# Patient Record
Sex: FEMALE | Race: WHITE | NOT HISPANIC OR LATINO | URBAN - METROPOLITAN AREA
[De-identification: names, ages, dates, MRNs, and addresses within clinical notes are randomized per-mention and may not be internally consistent; named-entity substitution may affect disease eponyms.]

---

## 2022-02-10 ENCOUNTER — OUTPATIENT (OUTPATIENT)
Dept: OUTPATIENT SERVICES | Facility: HOSPITAL | Age: 32
LOS: 1 days | Discharge: HOME | End: 2022-02-10

## 2022-02-10 DIAGNOSIS — N97.9 FEMALE INFERTILITY, UNSPECIFIED: ICD-10-CM

## 2022-06-14 ENCOUNTER — OUTPATIENT (OUTPATIENT)
Dept: OUTPATIENT SERVICES | Facility: HOSPITAL | Age: 32
LOS: 1 days | Discharge: HOME | End: 2022-06-14
Payer: COMMERCIAL

## 2022-06-14 VITALS
WEIGHT: 167.99 LBS | HEIGHT: 62 IN | HEART RATE: 60 BPM | SYSTOLIC BLOOD PRESSURE: 106 MMHG | TEMPERATURE: 98 F | RESPIRATION RATE: 16 BRPM | DIASTOLIC BLOOD PRESSURE: 64 MMHG | OXYGEN SATURATION: 98 %

## 2022-06-14 DIAGNOSIS — N84.0 POLYP OF CORPUS UTERI: ICD-10-CM

## 2022-06-14 DIAGNOSIS — Z01.818 ENCOUNTER FOR OTHER PREPROCEDURAL EXAMINATION: ICD-10-CM

## 2022-06-14 LAB
ALBUMIN SERPL ELPH-MCNC: 4.7 G/DL — SIGNIFICANT CHANGE UP (ref 3.5–5.2)
ALP SERPL-CCNC: 65 U/L — SIGNIFICANT CHANGE UP (ref 30–115)
ALT FLD-CCNC: 9 U/L — SIGNIFICANT CHANGE UP (ref 0–41)
ANION GAP SERPL CALC-SCNC: 12 MMOL/L — SIGNIFICANT CHANGE UP (ref 7–14)
APTT BLD: 30.4 SEC — SIGNIFICANT CHANGE UP (ref 27–39.2)
AST SERPL-CCNC: 16 U/L — SIGNIFICANT CHANGE UP (ref 0–41)
BASOPHILS # BLD AUTO: 0.07 K/UL — SIGNIFICANT CHANGE UP (ref 0–0.2)
BASOPHILS NFR BLD AUTO: 0.5 % — SIGNIFICANT CHANGE UP (ref 0–1)
BILIRUB SERPL-MCNC: 0.4 MG/DL — SIGNIFICANT CHANGE UP (ref 0.2–1.2)
BUN SERPL-MCNC: 13 MG/DL — SIGNIFICANT CHANGE UP (ref 10–20)
CALCIUM SERPL-MCNC: 9.5 MG/DL — SIGNIFICANT CHANGE UP (ref 8.5–10.1)
CHLORIDE SERPL-SCNC: 102 MMOL/L — SIGNIFICANT CHANGE UP (ref 98–110)
CO2 SERPL-SCNC: 23 MMOL/L — SIGNIFICANT CHANGE UP (ref 17–32)
CREAT SERPL-MCNC: 0.8 MG/DL — SIGNIFICANT CHANGE UP (ref 0.7–1.5)
EGFR: 101 ML/MIN/1.73M2 — SIGNIFICANT CHANGE UP
EOSINOPHIL # BLD AUTO: 0.07 K/UL — SIGNIFICANT CHANGE UP (ref 0–0.7)
EOSINOPHIL NFR BLD AUTO: 0.5 % — SIGNIFICANT CHANGE UP (ref 0–8)
GLUCOSE SERPL-MCNC: 66 MG/DL — LOW (ref 70–99)
HCT VFR BLD CALC: 40.1 % — SIGNIFICANT CHANGE UP (ref 37–47)
HGB BLD-MCNC: 13.2 G/DL — SIGNIFICANT CHANGE UP (ref 12–16)
IMM GRANULOCYTES NFR BLD AUTO: 0.6 % — HIGH (ref 0.1–0.3)
INR BLD: 1.03 RATIO — SIGNIFICANT CHANGE UP (ref 0.65–1.3)
LYMPHOCYTES # BLD AUTO: 2.8 K/UL — SIGNIFICANT CHANGE UP (ref 1.2–3.4)
LYMPHOCYTES # BLD AUTO: 20.1 % — LOW (ref 20.5–51.1)
MCHC RBC-ENTMCNC: 30.7 PG — SIGNIFICANT CHANGE UP (ref 27–31)
MCHC RBC-ENTMCNC: 32.9 G/DL — SIGNIFICANT CHANGE UP (ref 32–37)
MCV RBC AUTO: 93.3 FL — SIGNIFICANT CHANGE UP (ref 81–99)
MONOCYTES # BLD AUTO: 0.64 K/UL — HIGH (ref 0.1–0.6)
MONOCYTES NFR BLD AUTO: 4.6 % — SIGNIFICANT CHANGE UP (ref 1.7–9.3)
NEUTROPHILS # BLD AUTO: 10.24 K/UL — HIGH (ref 1.4–6.5)
NEUTROPHILS NFR BLD AUTO: 73.7 % — SIGNIFICANT CHANGE UP (ref 42.2–75.2)
NRBC # BLD: 0 /100 WBCS — SIGNIFICANT CHANGE UP (ref 0–0)
PLATELET # BLD AUTO: 309 K/UL — SIGNIFICANT CHANGE UP (ref 130–400)
POTASSIUM SERPL-MCNC: 4.1 MMOL/L — SIGNIFICANT CHANGE UP (ref 3.5–5)
POTASSIUM SERPL-SCNC: 4.1 MMOL/L — SIGNIFICANT CHANGE UP (ref 3.5–5)
PROT SERPL-MCNC: 7 G/DL — SIGNIFICANT CHANGE UP (ref 6–8)
PROTHROM AB SERPL-ACNC: 11.8 SEC — SIGNIFICANT CHANGE UP (ref 9.95–12.87)
RBC # BLD: 4.3 M/UL — SIGNIFICANT CHANGE UP (ref 4.2–5.4)
RBC # FLD: 13.9 % — SIGNIFICANT CHANGE UP (ref 11.5–14.5)
SODIUM SERPL-SCNC: 137 MMOL/L — SIGNIFICANT CHANGE UP (ref 135–146)
T3 SERPL-MCNC: 108 NG/DL — SIGNIFICANT CHANGE UP (ref 80–200)
T4 AB SER-ACNC: 12.2 UG/DL — HIGH (ref 4.6–12)
TSH SERPL-MCNC: 4.32 UIU/ML — HIGH (ref 0.27–4.2)
WBC # BLD: 13.91 K/UL — HIGH (ref 4.8–10.8)
WBC # FLD AUTO: 13.91 K/UL — HIGH (ref 4.8–10.8)

## 2022-06-14 PROCEDURE — 93010 ELECTROCARDIOGRAM REPORT: CPT

## 2022-06-14 NOTE — H&P PST ADULT - NSICDXPASTMEDICALHX_GEN_ALL_CORE_FT
PAST MEDICAL HISTORY:  Pneumonia 2016- hospitialized     PAST MEDICAL HISTORY:  Hypothyroid WAS HYPERTHYROID D/T NODULES/ [facility] RADIOACTIVE IODINE 2014    Pneumonia 2016- hospitialized

## 2022-06-14 NOTE — H&P PST ADULT - HISTORY OF PRESENT ILLNESS
33 Y/O FEMALE PRESENTS TO PAST WITH HX UTERINE POLYP. PT C/O              PT NOW FOR SCHEDULED PROCEDURE ( HYSTEROSCOPY, POLYPECTOMY . PT DENIES ANY CP SOB PALP COUGH DYSURIA FEVER URI. PT ABLE TO LIZETH 1-2 FOS W/O SOB  pt denies any covid s/s, or tested positive in the past  pt advised self quarantine till day of procedure  Anesthesia Alert  NO--Difficult Airway  NO--History of neck surgery or radiation  NO--Limited ROM of neck  NO--History of Malignant hyperthermia  NO--Personal or family history of Pseudocholinesterase deficiency.  NO--Prior Anesthesia Complication  NO--Latex Allergy  NO--Loose teeth  NO--History of Rheumatoid Arthritis  NO--FRACISCO  NO--Bleeding risk  NO--Other_____     31 Y/O FEMALE PRESENTS TO PAST WITH HX UTERINE POLYP. PT C/O              PT NOW FOR SCHEDULED PROCEDURE ( HYSTEROSCOPY, POLYPECTOMY . PT DENIES ANY CP SOB PALP COUGH DYSURIA FEVER URI. PT ABLE TO LIZETH 1-2 FOS W/O SOB  pt denies any covid s/s, 5/22  covid +   pt advised self quarantine till day of procedure  Anesthesia Alert  NO--Difficult Airway  NO--History of neck surgery or radiation  NO--Limited ROM of neck  NO--History of Malignant hyperthermia  NO--Personal or family history of Pseudocholinesterase deficiency.  NO--Prior Anesthesia Complication  NO--Latex Allergy  NO--Loose teeth  NO--History of Rheumatoid Arthritis  NO--FRACISCO  NO--Bleeding risk  NO--Other_____     33 Y/O FEMALE PRESENTS TO PAST WITH HX UTERINE POLYP. PT C/O POLYP DIFFICULTY GETTING PREGNANT FOR MONTHS. DENIES ANY ABD PAIN OR UTERINE BLEEDING    PT NOW FOR SCHEDULED PROCEDURE ( HYSTEROSCOPY, POLYPECTOMY . PT DENIES ANY CP SOB PALP COUGH DYSURIA FEVER URI. PT ABLE TO LIZETH 1-2 FOS W/O SOB  pt denies any covid s/s, 5/22  covid +   pt advised self quarantine till day of procedure  Anesthesia Alert  NO--Difficult Airway  NO--History of neck surgery or radiation  NO--Limited ROM of neck  NO--History of Malignant hyperthermia  NO--Personal or family history of Pseudocholinesterase deficiency.  NO--Prior Anesthesia Complication  NO--Latex Allergy  NO--Loose teeth  NO--History of Rheumatoid Arthritis  NO--FRACISCO  NO--Bleeding risk  NO--Other_____

## 2022-06-28 ENCOUNTER — TRANSCRIPTION ENCOUNTER (OUTPATIENT)
Age: 32
End: 2022-06-28

## 2022-06-28 ENCOUNTER — RESULT REVIEW (OUTPATIENT)
Age: 32
End: 2022-06-28

## 2022-06-28 ENCOUNTER — OUTPATIENT (OUTPATIENT)
Dept: OUTPATIENT SERVICES | Facility: HOSPITAL | Age: 32
LOS: 1 days | Discharge: HOME | End: 2022-06-28

## 2022-06-28 VITALS
HEIGHT: 62 IN | TEMPERATURE: 99 F | HEART RATE: 66 BPM | DIASTOLIC BLOOD PRESSURE: 78 MMHG | OXYGEN SATURATION: 100 % | WEIGHT: 164.02 LBS | SYSTOLIC BLOOD PRESSURE: 122 MMHG | RESPIRATION RATE: 16 BRPM

## 2022-06-28 VITALS — RESPIRATION RATE: 17 BRPM | DIASTOLIC BLOOD PRESSURE: 66 MMHG | SYSTOLIC BLOOD PRESSURE: 105 MMHG | HEART RATE: 59 BPM

## 2022-06-28 PROCEDURE — 88305 TISSUE EXAM BY PATHOLOGIST: CPT | Mod: 26

## 2022-06-28 RX ORDER — SODIUM CHLORIDE 9 MG/ML
1000 INJECTION, SOLUTION INTRAVENOUS
Refills: 0 | Status: DISCONTINUED | OUTPATIENT
Start: 2022-06-28 | End: 2022-07-12

## 2022-06-28 RX ORDER — HYDROMORPHONE HYDROCHLORIDE 2 MG/ML
0.5 INJECTION INTRAMUSCULAR; INTRAVENOUS; SUBCUTANEOUS ONCE
Refills: 0 | Status: DISCONTINUED | OUTPATIENT
Start: 2022-06-28 | End: 2022-06-28

## 2022-06-28 NOTE — ASU DISCHARGE PLAN (ADULT/PEDIATRIC) - NS MD DC FALL RISK RISK
For information on Fall & Injury Prevention, visit: [email or web address] OR  [email or web address] OR  https://www.cdc.gov/steadi/patient.html

## 2022-06-28 NOTE — ASU DISCHARGE PLAN (ADULT/PEDIATRIC) - CARE PROVIDER_API CALL
Lobito Saenz  OBSTETRICS AND GYNECOLOGY  [address] [place] [id number]  Phone: [phone]  Fax: [phone]  Follow Up Time:

## 2022-06-28 NOTE — ASU PATIENT PROFILE, ADULT - NSTOBACCO TYPE_GEN_A_CORE_RD
Pharmacy Vancomycin Kinetics Note for 12/3/2021     45 y.o. male on Vancomycin day # 1     Vancomycin Indication (AUC Dosing): Skin/skin structure infection    Provider specified end date: 12/10/21    Active Antibiotics (From admission, onward)    Ordered     Ordering Provider       Fri Dec 3, 2021  9:14 AM    12/03/21 0914  vancomycin (VANCOCIN) 1,750 mg in  mL IVPB  ONCE         Lauren Beltran M.D.       Fri Dec 3, 2021  9:13 AM    12/03/21 0913  ampicillin/sulbactam (UNASYN) 3 g in  mL IVPB  EVERY 6 HOURS         Ralph Lange M.D.       Fri Dec 3, 2021  8:59 AM    12/03/21 0859  MD Alert...Vancomycin per Pharmacy  PHARMACY TO DOSE        Question:  Indication(s) for vancomycin?  Answer:  Skin and soft tissue infection    Lauren Beltran M.D.          Dosing Weight: 72.6 kg (160 lb 0.9 oz)    Admission History: Admitted on 12/3/2021 for Cellulitis [L03.90]  Pertinent history: 44 y/o M presents with sepsis and agitation, SSTI 2/2 IVDU.  No wound cultures pending, MRSA pending collection, 12/3 blood cultures pending.    Allergies:     Patient has no allergy information on record.     Pertinent cultures to date:     Results     Procedure Component Value Units Date/Time    MRSA By PCR (Amp) [[id number]]     Order Status: Sent Specimen: Respirate from Nares     SARS-CoV-2 PCR (24 hour In-House): Collect NP swab in VTM [[id number]] Collected: 12/03/21 0510    Order Status: Completed Specimen: Respirate from Mid-Turbinate Updated: 12/03/21 0517     SARS-CoV-2 Source NP Swab    Narrative:      Collected By:  Have you been in close contact with a person who is suspected  or known to be positive for COVID-19 within the last 30 days  (e.g. last seen that person < 30 days ago)->Unknown    URINALYSIS [[id number]]  (Abnormal) Collected: 12/03/21 0346    Order Status: Completed Specimen: Urine Updated: 12/03/21 0410     Color DK Yellow     Character Cloudy     Specific Gravity 1.030     Ph 5.0     Glucose Negative mg/dL   "    Ketones Trace mg/dL      Protein 100 mg/dL      Bilirubin Negative     Urobilinogen, Urine 1.0     Nitrite Negative     Leukocyte Esterase Negative     Occult Blood Negative     Micro Urine Req Microscopic    Narrative:      Collected By:  Indication for culture:->Evaluation for sepsis without a  clear source of infection    URINE CULTURE(NEW) [[id number]] Collected: 21    Order Status: Completed Specimen: Urine Updated: 21    Narrative:      Collected By:  Indication for culture:->Evaluation for sepsis without a  clear source of infection    BLOOD CULTURE [[id number]] Collected: 21    Order Status: Completed Specimen: Blood from Peripheral Updated: 21    Narrative:      Collected By:  Per Hospital Policy: Only change Specimen Src: to \"Line\" if  specified by physician order.    BLOOD CULTURE [[id number]] Collected: 21    Order Status: Completed Specimen: Blood from Peripheral Updated: 21    Narrative:      Collected By:  Per Hospital Policy: Only change Specimen Src: to \"Line\" if  specified by physician order.          Labs:     Estimated Creatinine Clearance: 136.3 mL/min (by C-G formula based on SCr of 0.71 mg/dL).  Recent Labs     21  0143   WBC 10.2   NEUTSPOLYS 56.80     Recent Labs     21   BUN 22   CREATININE 0.71   ALBUMIN 2.6*       Intake/Output Summary (Last 24 hours) at 12/3/2021 1453  Last data filed at 12/3/2021 1028  Gross per 24 hour   Intake 1100 ml   Output --   Net 1100 ml      /70   Pulse 83   Temp 36 °C (96.8 °F) (Temporal)   Resp 20   Ht 1.778 m (5' 10\")   Wt 72.6 kg (160 lb)   SpO2 96%  Temp (24hrs), Av.2 °C (98.9 °F), Min:36 °C (96.8 °F), Max:38.2 °C (100.7 °F)      List concerns for Vancomycin clearance:          Pharmacokinetics:   AUC kinetics:   Ke (hr ^-1): 0.104 hr^-1  Half life: 6.66 hr  Clearance: 4.908  Estimated TDD: 2454  Estimated Dose: 890  Estimated interval: 8.7    A/P:     -  " Vancomycin dose: 1750 mg load and 750 mg q8h maintenance     -  Next vancomycin level(s): Do not anticipate necessity of levels for short 7 day course.  Unless +MRSA, then level on 12/5    -  Predicted vancomycin AUC from initial AUC test calculator: 458 mg·hr/L    Connie Leonard, PharmD  [id number]     Cigarettes

## 2022-06-28 NOTE — CHART NOTE - NSCHARTNOTEFT_GEN_A_CORE
PACU ANESTHESIA ADMISSION NOTE      Procedure:   Post op diagnosis:      ____  Intubated  TV:______       Rate: ______      FiO2: ______    _x___  Patent Airway    _x___  Full return of protective reflexes    _x___  Full recovery from anesthesia / back to baseline status    Vitals:  T(C): 37  HR: 70  BP: 96/54  RR: 16   SpO2: 100%     Mental Status:  _x___ Awake   _____ Alert   _____ Drowsy   _____ Sedated    Nausea/Vomiting:  _x___  NO       ______Yes,   See Post - Op Orders         Pain Scale (0-10):  __0___    Treatment: _x___ None    ____ See Post - Op/PCA Orders    Post - Operative Fluids:   __x__ Oral   ____ See Post - Op Orders    Plan: Discharge:   _x___Home       _____Floor     _____Critical Care    _____  Other:_________________    Comments:  No anesthesia issues or complications noted.  Discharge when criteria met.

## 2022-06-28 NOTE — ASU PREOP CHECKLIST - RESPIRATORY RATE (BREATHS/MIN)
DATE OF ADMISSION:  09/12/2018    This lady is 32 years of age being admitted for laparoscopic bilateral   salpingectomy.    ADMISSION DIAGNOSIS:  Multiparous patient requesting permanent sterilization.    HISTORY OF PRESENT ILLNESS:  Patient has been under my care for about 8 years,   has had 1 pregnancy delivered vaginally in the past and used birth control   pills in the past.  She is absolutely convinced that she will never get   pregnant again and requests permanent sterilization.  The benefits of removing   her fallopian tubes were discussed and she agreed and we will perform a   laparoscopic bilateral salpingectomy.  Her periods are regular.  She has no   chronic problem with pelvic pain, menorrhagia, dysfunctional bleeding or any   other significant bleeding disturbance.  She had a cone biopsy done years   before for severe dysplasia.  Her more recent Pap smears over the last few   years have been normal.    MEDICATIONS:  She has no medications that she takes on a chronic basis.    PAST SURGICAL HISTORY:  The only surgery she has had is she had a breast   augmentation and cone biopsy.    REVIEW OF SYSTEMS:  She has no major cardiorespiratory, CNS, endocrine   problems.    PHYSICAL EXAMINATION:  VITAL SIGNS:  She weighs 183 pounds.  She is 5 feet 6 inches.  Her most   recently listed blood pressure is 118/70, pulse 68 and regular.  SKIN:  Her color was good.  NECK:  No goiter.  LUNGS:  Clear.  HEART:  Sounds normal.  BREASTS:  Benign.  She has bilateral implants.  ABDOMEN:  There is no hepatosplenomegaly.  No pelvic abdominal mass.  No   localized or generalized lymphadenopathy.  PELVIC:  Normal, well-supported uterus, axial in direction, 180 grams in size.    I appreciate no pathology of fullness or thickening on exam.  EXTREMITIES:  Exam is normal.    ASSESSMENT AND PLAN:  She is fully counseled as to surgery.  She understands   the goal is to permanently remove her ability to conceive in the future.  She    is very comfortable with her children.  I answered all her questions.  She   will be n.p.o. for 10 hours before the surgery and she will present to [facility] 2 hours before the procedure.       ____________________________________     MD CAITLYN PEDROZA / AJ    DD:  09/11/2018 20:37:18  DT:  09/12/2018 00:29:14    D#:  [id number]  Job#:  [id number]   16

## 2022-06-28 NOTE — ASU DISCHARGE PLAN (ADULT/PEDIATRIC) - ASU DC SPECIAL INSTRUCTIONSFT
Nothing in the vagina for 2 weeks - no sex, tampons, douching, tub baths or pools. May Shower. If you have a fever over 100.4F, severe pain or severe bleeding, please call your doctor or visit the emergency room.

## 2022-06-28 NOTE — ASU PATIENT PROFILE, ADULT - FALL HARM RISK - HARM RISK INTERVENTIONS

## 2022-07-01 LAB — SURGICAL PATHOLOGY STUDY: SIGNIFICANT CHANGE UP

## 2022-07-05 DIAGNOSIS — Z88.0 ALLERGY STATUS TO PENICILLIN: ICD-10-CM

## 2022-07-05 DIAGNOSIS — E03.9 HYPOTHYROIDISM, UNSPECIFIED: ICD-10-CM

## 2022-07-05 DIAGNOSIS — N84.0 POLYP OF CORPUS UTERI: ICD-10-CM

## 2022-08-21 ENCOUNTER — EMERGENCY (EMERGENCY)
Facility: HOSPITAL | Age: 32
LOS: 0 days | Discharge: HOME | End: 2022-08-21
Attending: STUDENT IN AN ORGANIZED HEALTH CARE EDUCATION/TRAINING PROGRAM | Admitting: STUDENT IN AN ORGANIZED HEALTH CARE EDUCATION/TRAINING PROGRAM

## 2022-08-21 VITALS
SYSTOLIC BLOOD PRESSURE: 124 MMHG | DIASTOLIC BLOOD PRESSURE: 77 MMHG | RESPIRATION RATE: 17 BRPM | WEIGHT: 169.98 LBS | OXYGEN SATURATION: 98 % | HEART RATE: 67 BPM | TEMPERATURE: 98 F | HEIGHT: 62 IN

## 2022-08-21 VITALS
HEART RATE: 68 BPM | RESPIRATION RATE: 16 BRPM | OXYGEN SATURATION: 96 % | SYSTOLIC BLOOD PRESSURE: 121 MMHG | DIASTOLIC BLOOD PRESSURE: 75 MMHG

## 2022-08-21 DIAGNOSIS — Z87.01 PERSONAL HISTORY OF PNEUMONIA (RECURRENT): ICD-10-CM

## 2022-08-21 DIAGNOSIS — O20.0 THREATENED ABORTION: ICD-10-CM

## 2022-08-21 DIAGNOSIS — Z3A.01 LESS THAN 8 WEEKS GESTATION OF PREGNANCY: ICD-10-CM

## 2022-08-21 DIAGNOSIS — O99.281 ENDOCRINE, NUTRITIONAL AND METABOLIC DISEASES COMPLICATING PREGNANCY, FIRST TRIMESTER: ICD-10-CM

## 2022-08-21 DIAGNOSIS — O20.9 HEMORRHAGE IN EARLY PREGNANCY, UNSPECIFIED: ICD-10-CM

## 2022-08-21 DIAGNOSIS — E03.9 HYPOTHYROIDISM, UNSPECIFIED: ICD-10-CM

## 2022-08-21 DIAGNOSIS — Z88.0 ALLERGY STATUS TO PENICILLIN: ICD-10-CM

## 2022-08-21 DIAGNOSIS — R10.9 UNSPECIFIED ABDOMINAL PAIN: ICD-10-CM

## 2022-08-21 LAB
APPEARANCE UR: CLEAR — SIGNIFICANT CHANGE UP
BILIRUB UR-MCNC: NEGATIVE — SIGNIFICANT CHANGE UP
BLD GP AB SCN SERPL QL: SIGNIFICANT CHANGE UP
COLOR SPEC: YELLOW — SIGNIFICANT CHANGE UP
DIFF PNL FLD: NEGATIVE — SIGNIFICANT CHANGE UP
GLUCOSE UR QL: NEGATIVE — SIGNIFICANT CHANGE UP
HCG SERPL-ACNC: HIGH MIU/ML
HCT VFR BLD CALC: 40.2 % — SIGNIFICANT CHANGE UP (ref 37–47)
HGB BLD-MCNC: 14.2 G/DL — SIGNIFICANT CHANGE UP (ref 12–16)
KETONES UR-MCNC: NEGATIVE — SIGNIFICANT CHANGE UP
LEUKOCYTE ESTERASE UR-ACNC: NEGATIVE — SIGNIFICANT CHANGE UP
MCHC RBC-ENTMCNC: 32 PG — HIGH (ref 27–31)
MCHC RBC-ENTMCNC: 35.3 G/DL — SIGNIFICANT CHANGE UP (ref 32–37)
MCV RBC AUTO: 90.5 FL — SIGNIFICANT CHANGE UP (ref 81–99)
NITRITE UR-MCNC: NEGATIVE — SIGNIFICANT CHANGE UP
NRBC # BLD: 0 /100 WBCS — SIGNIFICANT CHANGE UP (ref 0–0)
PH UR: 6.5 — SIGNIFICANT CHANGE UP (ref 5–8)
PLATELET # BLD AUTO: 283 K/UL — SIGNIFICANT CHANGE UP (ref 130–400)
PROT UR-MCNC: SIGNIFICANT CHANGE UP
RBC # BLD: 4.44 M/UL — SIGNIFICANT CHANGE UP (ref 4.2–5.4)
RBC # FLD: 13.3 % — SIGNIFICANT CHANGE UP (ref 11.5–14.5)
SP GR SPEC: 1.03 — SIGNIFICANT CHANGE UP (ref 1.01–1.03)
UROBILINOGEN FLD QL: SIGNIFICANT CHANGE UP
WBC # BLD: 15.92 K/UL — HIGH (ref 4.8–10.8)
WBC # FLD AUTO: 15.92 K/UL — HIGH (ref 4.8–10.8)

## 2022-08-21 PROCEDURE — 76830 TRANSVAGINAL US NON-OB: CPT | Mod: 26

## 2022-08-21 PROCEDURE — 99285 EMERGENCY DEPT VISIT HI MDM: CPT

## 2022-08-21 NOTE — ED PROVIDER NOTE - NS ED ROS FT
Review of Systems    Constitutional: (-) fever  Eyes/ENT: (-) blurry vision, (-) epistaxis  Cardiovascular: (-) chest pain, (-) syncope  Respiratory: (-) cough, (-) shortness of breath  Gastrointestinal: (-) vomiting, (-) diarrhea  : + Vaginal bleeding, abdominal pain  Musculoskeletal: (-) neck pain, (-) back pain, (-) joint pain  Integumentary: (-) rash, (-) edema  Neurological: (-) headache, (-) altered mental status  Psychiatric: (-) hallucinations  Allergic/Immunologic: (-) pruritus  All other systems are negative except as mentioned above

## 2022-08-21 NOTE — ED PROVIDER NOTE - NSICDXPASTMEDICALHX_GEN_ALL_CORE_FT
PAST MEDICAL HISTORY:  Hypothyroid WAS HYPERTHYROID D/T NODULES/ GOITER RCVD RADIOACTIVE IODINE 2014    Pneumonia 2016- hospitialized

## 2022-08-21 NOTE — ED PROVIDER NOTE - NSFOLLOWUPCLINICS_GEN_ALL_ED_FT
An OB/GYN physician  Obstetrics & Gynecology  .  [place]   Phone:   Fax:   Follow Up Time: Urgent    [facility]  OB/GYN  [address] [place] [address]  Phone: [phone]  Fax:   Follow Up Time: Urgent

## 2022-08-21 NOTE — ED PROVIDER NOTE - PATIENT PORTAL LINK FT
You can access the FollowMyHealth Patient Portal offered by [facility] by registering at the following website: [email or web address]. By joining [employer]’s FollowMyHealth portal, you will also be able to view your health information using other applications (apps) compatible with our system.

## 2022-08-21 NOTE — ED ADULT NURSE NOTE - CAS EDN DISCHARGE INTERVENTIONS
QUICK REFERENCE INFORMATION:  The ABCs of the Annual Wellness Visit    Subsequent Medicare Wellness Visit    HEALTH RISK ASSESSMENT    1947    Recent Hospitalizations:  No hospitalization(s) within the last year..        Current Medical Providers:  Patient Care Team:  Kavon Wheat MD as PCP - General (Internal Medicine)        Smoking Status:  Social History     Tobacco Use   Smoking Status Never Smoker   Smokeless Tobacco Never Used       Alcohol Consumption:  Social History     Substance and Sexual Activity   Alcohol Use No       Depression Screen:   PHQ-2/PHQ-9 Depression Screening 2/8/2019   Little interest or pleasure in doing things 0   Feeling down, depressed, or hopeless 0   Trouble falling or staying asleep, or sleeping too much -   Feeling tired or having little energy -   Poor appetite or overeating -   Feeling bad about yourself - or that you are a failure or have let yourself or your family down -   Trouble concentrating on things, such as reading the newspaper or watching television -   Moving or speaking so slowly that other people could have noticed. Or the opposite - being so fidgety or restless that you have been moving around a lot more than usual -   Thoughts that you would be better off dead, or of hurting yourself in some way -   Total Score 0       Health Habits and Functional and Cognitive Screening:  Functional & Cognitive Status 2/8/2019   Do you have difficulty preparing food and eating? No   Do you have difficulty bathing yourself, getting dressed or grooming yourself? No   Do you have difficulty using the toilet? No   Do you have difficulty moving around from place to place? No   Do you have trouble with steps or getting out of a bed or a chair? No   In the past year have you fallen or experienced a near fall? No   Current Diet Well Balanced Diet   Dental Exam Up to date   Eye Exam Up to date   Exercise (times per week) 1 times per week   Current Exercise Activities Include (No  Data)   Do you need help using the phone?  No   Are you deaf or do you have serious difficulty hearing?  No   Do you need help with transportation? No   Do you need help shopping? No   Do you need help preparing meals?  No   Do you need help with housework?  No   Do you need help with laundry? No   Do you need help taking your medications? No   Do you need help managing money? No   Do you ever drive or ride in a car without wearing a seat belt? No   Have you felt unusual stress, anger or loneliness in the last month? Yes   Who do you live with? Spouse   If you need help, do you have trouble finding someone available to you? No   Have you been bothered in the last four weeks by sexual problems? No   Do you have difficulty concentrating, remembering or making decisions? No           Does the patient have evidence of cognitive impairment? No    Aspirin use counseling: Taking ASA appropriately as indicated      Recent Lab Results:  CMP:  Lab Results   Component Value Date     (H) 12/18/2018    BUN 17 12/18/2018    CREATININE 1.42 (H) 12/18/2018    EGFRIFNONA 37 (L) 12/18/2018    EGFRIFAFRI 44 (L) 12/18/2018    BCR 12.0 12/18/2018     12/18/2018    K 4.5 12/18/2018    CO2 28.5 12/18/2018    CALCIUM 10.3 12/18/2018    PROTENTOTREF 6.6 12/18/2018    ALBUMIN 4.40 12/18/2018    LABGLOBREF 2.2 12/18/2018    LABIL2 2.0 12/18/2018    BILITOT 0.4 12/18/2018    ALKPHOS 56 12/18/2018    AST 19 12/18/2018    ALT 21 12/18/2018     Lipid Panel:  Lab Results   Component Value Date    TRIG 295 (H) 11/09/2018    HDL 43 11/09/2018    VLDL 59 (H) 11/09/2018    LDLHDL 1.86 11/09/2018     HbA1c:  Lab Results   Component Value Date    HGBA1C 5.40 11/19/2018       Visual Acuity:  No exam data present    Age-appropriate Screening Schedule:  Refer to the list below for future screening recommendations based on patient's age, sex and/or medical conditions. Orders for these recommended tests are listed in the plan section. The  patient has been provided with a written plan.    Health Maintenance   Topic Date Due   • TDAP/TD VACCINES (1 - Tdap) 01/04/1967   • ZOSTER VACCINE (1 of 2) 01/04/1998   • LIPID PANEL  11/09/2019   • DXA SCAN  11/10/2019   • MAMMOGRAM  07/02/2020   • COLONOSCOPY  03/18/2024   • INFLUENZA VACCINE  Completed   • PNEUMOCOCCAL VACCINES (65+ LOW/MEDIUM RISK)  Completed        Subjective   History of Present Illness    Rosalina Ferrell is a 72 y.o. female who presents for an Subsequent Wellness Visit.    The following portions of the patient's history were reviewed and updated as appropriate: allergies, current medications, past family history, past medical history, past social history, past surgical history and problem list.    Outpatient Medications Prior to Visit   Medication Sig Dispense Refill   • aspirin 81 MG tablet Take 81 mg by mouth Daily.     • desonide (DESOWEN) 0.05 % cream Apply  topically to the appropriate area as directed 2 (Two) Times a Day.     • divalproex (DEPAKOTE) 250 MG EC tablet Take by mouth.     • levothyroxine (SYNTHROID, LEVOTHROID) 100 MCG tablet TAKE 1 TABLET EVERY MORNING 90 tablet 1   • lithium (ESKALITH) 450 MG CR tablet Take by mouth.     • mometasone (ELOCON) 0.1 % lotion Apply  topically to the appropriate area as directed Daily.     • propranolol (INDERAL) 40 MG tablet Take by mouth.     • QUEtiapine (SEROquel) 25 MG tablet      • simvastatin (ZOCOR) 40 MG tablet TAKE 1 TABLET EVERY NIGHT 90 tablet 1   • lithium (LITHOBID) 300 MG CR tablet Take by mouth.       No facility-administered medications prior to visit.        Patient Active Problem List   Diagnosis   • Vitamin D deficiency   • Urinary incontinence   • Compression fracture of vertebral column (CMS/HCC)   • Hypothyroidism   • Osteopenia   • Hypernatremia   • Hyperlipidemia   • Hypertonicity of bladder   • Impaired fasting glucose   • Elevated aspartate aminotransferase level   • Elevated alanine aminotransferase (ALT) level   •  "Chronic kidney disease, stage II (mild)   • Bipolar I disorder, single manic episode (CMS/HCC)   • Generalized abdominal pain   • Gastroesophageal reflux disease without esophagitis   • Tremor   • Elevated fasting glucose   • Constipation   • MCI (mild cognitive impairment)   • Medication-induced postural tremor   • Parkinsonism (CMS/HCC)   • Hallucinations       Advance Care Planning:  has an advance directive - a copy has been provided and is in file    Identification of Risk Factors:  Risk factors include: cardiovascular risk and increased fall risk.    Review of Systems    Compared to one year ago, the patient feels her physical health is the same.  Compared to one year ago, the patient feels her mental health is the same.    Objective     Physical Exam    Vitals:    02/08/19 0855   BP: 120/74   BP Location: Left arm   Patient Position: Sitting   Cuff Size: Adult   Pulse: 54   Resp: 18   Temp: 97.6 °F (36.4 °C)   TempSrc: Oral   SpO2: 94%   Weight: 81 kg (178 lb 9.6 oz)   Height: 162.6 cm (64.02\")   PainSc: 0-No pain       Patient's Body mass index is 30.64 kg/m². BMI is above normal parameters. Recommendations include: no follow-up required.      Assessment/Plan   Patient Self-Management and Personalized Health Advice  The patient has been provided with information about: . and preventive services including:   · discussed shingrix, cost is a factor, .  · Discussed tdap     Visit Diagnoses:    ICD-10-CM ICD-9-CM   1. Medicare annual wellness visit, subsequent Z00.00 V70.0       No orders of the defined types were placed in this encounter.      Outpatient Encounter Medications as of 2/8/2019   Medication Sig Dispense Refill   • aspirin 81 MG tablet Take 81 mg by mouth Daily.     • desonide (DESOWEN) 0.05 % cream Apply  topically to the appropriate area as directed 2 (Two) Times a Day.     • divalproex (DEPAKOTE) 250 MG EC tablet Take by mouth.     • levothyroxine (SYNTHROID, LEVOTHROID) 100 MCG tablet TAKE 1 " TABLET EVERY MORNING 90 tablet 1   • lithium (ESKALITH) 450 MG CR tablet Take by mouth.     • mometasone (ELOCON) 0.1 % lotion Apply  topically to the appropriate area as directed Daily.     • propranolol (INDERAL) 40 MG tablet Take by mouth.     • QUEtiapine (SEROquel) 25 MG tablet      • simvastatin (ZOCOR) 40 MG tablet TAKE 1 TABLET EVERY NIGHT 90 tablet 1   • [DISCONTINUED] lithium (LITHOBID) 300 MG CR tablet Take by mouth.       No facility-administered encounter medications on file as of 2/8/2019.        Reviewed use of high risk medication in the elderly: yes  Reviewed for potential of harmful drug interactions in the elderly: yes    Follow Up:  No Follow-up on file.     An After Visit Summary and PPPS with all of these plans were given to the patient.          IV discontinued, cath removed intact

## 2022-08-21 NOTE — ED ADULT NURSE NOTE - NSIMPLEMENTINTERV_GEN_ALL_ED
Implemented All Universal Safety Interventions:  [place] to call system. Call bell, personal items and telephone within reach. Instruct patient to call for assistance. Room bathroom lighting operational. Non-slip footwear when patient is off stretcher. Physically safe environment: no spills, clutter or unnecessary equipment. Stretcher in lowest position, wheels locked, appropriate side rails in place.
pending progress

## 2022-08-21 NOTE — ED PROVIDER NOTE - CLINICAL SUMMARY MEDICAL DECISION MAKING FREE TEXT BOX
32-year-old female presents today with vaginal bleeding and abdominal pain.  Patient's ultrasound is indicative of IUP with fetal heart rate.   Beta quant is elevated.  Patient left emergency department prior to reevaluation.  Patient has follow-up with OB/GYN.

## 2022-08-22 PROBLEM — J18.9 PNEUMONIA, UNSPECIFIED ORGANISM: Chronic | Status: ACTIVE | Noted: 2022-06-14

## 2022-08-22 PROBLEM — E03.9 HYPOTHYROIDISM, UNSPECIFIED: Chronic | Status: ACTIVE | Noted: 2022-06-14

## 2022-09-29 NOTE — H&P PST ADULT - NSSUBSTANCEUSE_GEN_ALL_CORE_SD
Physical Therapy        Physical Therapy Cancel Note      DATE: 2022    NAME: Kathryn Hutchison  MRN: [id number]   : 2001      Patient not seen this date for Physical Therapy due to:    Patient independent with functional mobility. Pt educated on purpose of PT eval, POC, and importance of mobility. Pt verbalizes no concerns in regards to functional mobility upon discharge. Will defer PT evaluation at this time. Please reorder PT if future needs arise.        Electronically signed by Gareth Lopez PT on 2022 at 11:05 AM never used

## 2022-10-04 ENCOUNTER — APPOINTMENT (OUTPATIENT)
Dept: ANTEPARTUM | Facility: CLINIC | Age: 32
End: 2022-10-04

## 2022-10-04 ENCOUNTER — NON-APPOINTMENT (OUTPATIENT)
Age: 32
End: 2022-10-04

## 2022-10-04 ENCOUNTER — ASOB RESULT (OUTPATIENT)
Age: 32
End: 2022-10-04

## 2022-10-04 VITALS
DIASTOLIC BLOOD PRESSURE: 78 MMHG | BODY MASS INDEX: 36.07 KG/M2 | WEIGHT: 196 LBS | HEART RATE: 76 BPM | HEIGHT: 62 IN | SYSTOLIC BLOOD PRESSURE: 112 MMHG

## 2022-10-04 DIAGNOSIS — O41.8X10 OTHER SPECIFIED DISORDERS OF AMNIOTIC FLUID AND MEMBRANES, FIRST TRIMESTER, NOT APPLICABLE OR UNSPECIFIED: ICD-10-CM

## 2022-10-04 DIAGNOSIS — O99.280 ENDOCRINE, NUTRITIONAL AND METABOLIC DISEASES COMPLICATING PREGNANCY, UNSPECIFIED TRIMESTER: ICD-10-CM

## 2022-10-04 DIAGNOSIS — E03.9 ENDOCRINE, NUTRITIONAL AND METABOLIC DISEASES COMPLICATING PREGNANCY, UNSPECIFIED TRIMESTER: ICD-10-CM

## 2022-10-04 DIAGNOSIS — O09.819 SUPERVISION OF PREGNANCY RESULTING FROM ASSISTED REPRODUCTIVE TECHNOLOGY, UNSPECIFIED TRIMESTER: ICD-10-CM

## 2022-10-04 DIAGNOSIS — O46.8X1 OTHER SPECIFIED DISORDERS OF AMNIOTIC FLUID AND MEMBRANES, FIRST TRIMESTER, NOT APPLICABLE OR UNSPECIFIED: ICD-10-CM

## 2022-10-04 PROBLEM — Z00.00 ENCOUNTER FOR PREVENTIVE HEALTH EXAMINATION: Status: ACTIVE | Noted: 2022-10-04

## 2022-10-04 PROCEDURE — 76813 OB US NUCHAL MEAS 1 GEST: CPT

## 2022-10-04 RX ORDER — LEVOTHYROXINE SODIUM 0.1 MG/1
100 TABLET ORAL
Refills: 0 | Status: ACTIVE | COMMUNITY
Start: 2022-10-04

## 2022-11-28 ENCOUNTER — ASOB RESULT (OUTPATIENT)
Age: 32
End: 2022-11-28

## 2022-11-28 ENCOUNTER — APPOINTMENT (OUTPATIENT)
Dept: ANTEPARTUM | Facility: CLINIC | Age: 32
End: 2022-11-28

## 2022-11-28 ENCOUNTER — APPOINTMENT (OUTPATIENT)
Dept: PEDIATRIC CARDIOLOGY | Facility: CLINIC | Age: 32
End: 2022-11-28

## 2022-11-28 VITALS
HEART RATE: 86 BPM | SYSTOLIC BLOOD PRESSURE: 120 MMHG | WEIGHT: 213 LBS | BODY MASS INDEX: 39.2 KG/M2 | DIASTOLIC BLOOD PRESSURE: 78 MMHG | HEIGHT: 62 IN

## 2022-11-28 PROCEDURE — 93325 DOPPLER ECHO COLOR FLOW MAPG: CPT

## 2022-11-28 PROCEDURE — 76827 ECHO EXAM OF FETAL HEART: CPT

## 2022-11-28 PROCEDURE — 99205 OFFICE O/P NEW HI 60 MIN: CPT | Mod: 25

## 2022-11-28 PROCEDURE — 76811 OB US DETAILED SNGL FETUS: CPT

## 2022-11-28 PROCEDURE — 76825 ECHO EXAM OF FETAL HEART: CPT

## 2022-11-28 NOTE — DISCUSSION/SUMMARY
[FreeTextEntry1] : See above\par Normal fetal echocardiogram\par  IVF pregnancy\par Recommend routine prenatal care and delivery\par No need for cardiac follow up unless any new concerns

## 2022-11-28 NOTE — PHYSICAL EXAM
[General Appearance - In No Acute Distress] : in no acute distress [General Appearance - Well Nourished] : well nourished [General Appearance - Well Developed] : well developed

## 2022-11-28 NOTE — HISTORY OF PRESENT ILLNESS
[FreeTextEntry1] : Dear Dr. DOREEN MANUEL,\par \par I had the pleasure of seeing your patient, YANELIS LY, in my office today, 11/28/2022. As you know, she is a 32 year old female referred to pediatric cardiology for fetal echocardiogram in the setting of IVF pregnancy. She was accompanied by her . \par \par YANELIS has been doing well from a clinical standpoint.\par There is no family history of congenital heart disease or unexplained death. No genetic syndromes.\par \par Today's echocardiogram was normal. Please see attached report.\par Given today's results, I recommended routine delivery, with cardiology consult or follow up postnatally, as appropriate, if there are any concerns.\par \par I also discussed the limitation of this study with the patient. Specifically, limitations include inability to predict persistent patency of normal fetal structures (ductus arteriosus and foramen ovale) as well as narrowing of the aorta and/or branch pulmonary arteries associated with spontaneous closure of the ductus arteriosus after birth. Limitations of fetal echocardiography also include inability to exclude small interventricular septal defects, small interatrial septal defects, some abnormalities of the semilunar (aortic/pulmonary) valves and/or the atrioventricular (mitral/tricuspid) valves and some abnormalities of systemic or pulmonary venous return.\par \par \par \par \par

## 2022-11-28 NOTE — REASON FOR VISIT
[Initial Consultation] : an initial consultation for [FreeTextEntry3] : Fetal echocardiogram [Patient] : patient

## 2022-12-29 ENCOUNTER — RESULT REVIEW (OUTPATIENT)
Age: 32
End: 2022-12-29

## 2022-12-29 ENCOUNTER — INPATIENT (INPATIENT)
Facility: HOSPITAL | Age: 32
LOS: 0 days | Discharge: HOME | End: 2022-12-29
Attending: OBSTETRICS & GYNECOLOGY | Admitting: OBSTETRICS & GYNECOLOGY
Payer: COMMERCIAL

## 2022-12-29 ENCOUNTER — TRANSCRIPTION ENCOUNTER (OUTPATIENT)
Age: 32
End: 2022-12-29

## 2022-12-29 VITALS
HEART RATE: 72 BPM | TEMPERATURE: 99 F | SYSTOLIC BLOOD PRESSURE: 123 MMHG | RESPIRATION RATE: 18 BRPM | DIASTOLIC BLOOD PRESSURE: 75 MMHG

## 2022-12-29 VITALS — DIASTOLIC BLOOD PRESSURE: 70 MMHG | HEART RATE: 68 BPM | SYSTOLIC BLOOD PRESSURE: 119 MMHG

## 2022-12-29 DIAGNOSIS — Z3A.25 25 WEEKS GESTATION OF PREGNANCY: ICD-10-CM

## 2022-12-29 DIAGNOSIS — O99.891 OTHER SPECIFIED DISEASES AND CONDITIONS COMPLICATING PREGNANCY: ICD-10-CM

## 2022-12-29 DIAGNOSIS — Z20.822 CONTACT WITH AND (SUSPECTED) EXPOSURE TO COVID-19: ICD-10-CM

## 2022-12-29 DIAGNOSIS — Z28.21 IMMUNIZATION NOT CARRIED OUT BECAUSE OF PATIENT REFUSAL: ICD-10-CM

## 2022-12-29 DIAGNOSIS — N20.0 CALCULUS OF KIDNEY: ICD-10-CM

## 2022-12-29 DIAGNOSIS — O99.282 ENDOCRINE, NUTRITIONAL AND METABOLIC DISEASES COMPLICATING PREGNANCY, SECOND TRIMESTER: ICD-10-CM

## 2022-12-29 DIAGNOSIS — E03.9 HYPOTHYROIDISM, UNSPECIFIED: ICD-10-CM

## 2022-12-29 DIAGNOSIS — O99.612 DISEASES OF THE DIGESTIVE SYSTEM COMPLICATING PREGNANCY, SECOND TRIMESTER: ICD-10-CM

## 2022-12-29 DIAGNOSIS — Z28.09 IMMUNIZATION NOT CARRIED OUT BECAUSE OF OTHER CONTRAINDICATION: ICD-10-CM

## 2022-12-29 DIAGNOSIS — K21.9 GASTRO-ESOPHAGEAL REFLUX DISEASE WITHOUT ESOPHAGITIS: ICD-10-CM

## 2022-12-29 DIAGNOSIS — Z88.0 ALLERGY STATUS TO PENICILLIN: ICD-10-CM

## 2022-12-29 LAB
ALBUMIN SERPL ELPH-MCNC: 3.4 G/DL — LOW (ref 3.5–5.2)
ALBUMIN SERPL ELPH-MCNC: 3.8 G/DL — SIGNIFICANT CHANGE UP (ref 3.5–5.2)
ALP SERPL-CCNC: 75 U/L — SIGNIFICANT CHANGE UP (ref 30–115)
ALP SERPL-CCNC: 82 U/L — SIGNIFICANT CHANGE UP (ref 30–115)
ALT FLD-CCNC: 7 U/L — SIGNIFICANT CHANGE UP (ref 0–41)
ALT FLD-CCNC: 8 U/L — SIGNIFICANT CHANGE UP (ref 0–41)
AMYLASE P1 CFR SERPL: 112 U/L — SIGNIFICANT CHANGE UP (ref 25–115)
AMYLASE P1 CFR SERPL: 158 U/L — HIGH (ref 25–115)
ANION GAP SERPL CALC-SCNC: 10 MMOL/L — SIGNIFICANT CHANGE UP (ref 7–14)
ANION GAP SERPL CALC-SCNC: 15 MMOL/L — HIGH (ref 7–14)
APPEARANCE UR: ABNORMAL
APPEARANCE UR: CLEAR — SIGNIFICANT CHANGE UP
AST SERPL-CCNC: 11 U/L — SIGNIFICANT CHANGE UP (ref 0–41)
AST SERPL-CCNC: 13 U/L — SIGNIFICANT CHANGE UP (ref 0–41)
BACTERIA # UR AUTO: ABNORMAL
BACTERIA # UR AUTO: NEGATIVE — SIGNIFICANT CHANGE UP
BASOPHILS # BLD AUTO: 0.05 K/UL — SIGNIFICANT CHANGE UP (ref 0–0.2)
BASOPHILS # BLD AUTO: 0.07 K/UL — SIGNIFICANT CHANGE UP (ref 0–0.2)
BASOPHILS NFR BLD AUTO: 0.3 % — SIGNIFICANT CHANGE UP (ref 0–1)
BASOPHILS NFR BLD AUTO: 0.4 % — SIGNIFICANT CHANGE UP (ref 0–1)
BILIRUB SERPL-MCNC: 0.2 MG/DL — SIGNIFICANT CHANGE UP (ref 0.2–1.2)
BILIRUB SERPL-MCNC: 0.4 MG/DL — SIGNIFICANT CHANGE UP (ref 0.2–1.2)
BILIRUB UR-MCNC: NEGATIVE — SIGNIFICANT CHANGE UP
BILIRUB UR-MCNC: NEGATIVE — SIGNIFICANT CHANGE UP
BLD GP AB SCN SERPL QL: SIGNIFICANT CHANGE UP
BUN SERPL-MCNC: 11 MG/DL — SIGNIFICANT CHANGE UP (ref 10–20)
BUN SERPL-MCNC: 9 MG/DL — LOW (ref 10–20)
CALCIUM SERPL-MCNC: 8.7 MG/DL — SIGNIFICANT CHANGE UP (ref 8.4–10.5)
CALCIUM SERPL-MCNC: 9.4 MG/DL — SIGNIFICANT CHANGE UP (ref 8.4–10.5)
CHLORIDE SERPL-SCNC: 103 MMOL/L — SIGNIFICANT CHANGE UP (ref 98–110)
CHLORIDE SERPL-SCNC: 106 MMOL/L — SIGNIFICANT CHANGE UP (ref 98–110)
CO2 SERPL-SCNC: 20 MMOL/L — SIGNIFICANT CHANGE UP (ref 17–32)
CO2 SERPL-SCNC: 24 MMOL/L — SIGNIFICANT CHANGE UP (ref 17–32)
COD CRY URNS QL: ABNORMAL
COLOR SPEC: SIGNIFICANT CHANGE UP
COLOR SPEC: YELLOW — SIGNIFICANT CHANGE UP
CREAT SERPL-MCNC: 0.7 MG/DL — SIGNIFICANT CHANGE UP (ref 0.7–1.5)
CREAT SERPL-MCNC: 0.9 MG/DL — SIGNIFICANT CHANGE UP (ref 0.7–1.5)
DIFF PNL FLD: ABNORMAL
DIFF PNL FLD: ABNORMAL
EGFR: 119 ML/MIN/1.73M2 — SIGNIFICANT CHANGE UP
EGFR: 88 ML/MIN/1.73M2 — SIGNIFICANT CHANGE UP
EOSINOPHIL # BLD AUTO: 0.06 K/UL — SIGNIFICANT CHANGE UP (ref 0–0.7)
EOSINOPHIL # BLD AUTO: 0.08 K/UL — SIGNIFICANT CHANGE UP (ref 0–0.7)
EOSINOPHIL NFR BLD AUTO: 0.3 % — SIGNIFICANT CHANGE UP (ref 0–8)
EOSINOPHIL NFR BLD AUTO: 0.5 % — SIGNIFICANT CHANGE UP (ref 0–8)
EPI CELLS # UR: 2 /HPF — SIGNIFICANT CHANGE UP (ref 0–5)
EPI CELLS # UR: 5 /HPF — SIGNIFICANT CHANGE UP (ref 0–5)
GLUCOSE SERPL-MCNC: 107 MG/DL — HIGH (ref 70–99)
GLUCOSE SERPL-MCNC: 78 MG/DL — SIGNIFICANT CHANGE UP (ref 70–99)
GLUCOSE UR QL: NEGATIVE — SIGNIFICANT CHANGE UP
GLUCOSE UR QL: NEGATIVE — SIGNIFICANT CHANGE UP
HCT VFR BLD CALC: 30.8 % — LOW (ref 37–47)
HCT VFR BLD CALC: 33.6 % — LOW (ref 37–47)
HGB BLD-MCNC: 10.3 G/DL — LOW (ref 12–16)
HGB BLD-MCNC: 11.4 G/DL — LOW (ref 12–16)
HYALINE CASTS # UR AUTO: 1 /LPF — SIGNIFICANT CHANGE UP (ref 0–7)
HYALINE CASTS # UR AUTO: 12 /LPF — HIGH (ref 0–7)
IMM GRANULOCYTES NFR BLD AUTO: 0.6 % — HIGH (ref 0.1–0.3)
IMM GRANULOCYTES NFR BLD AUTO: 0.8 % — HIGH (ref 0.1–0.3)
KETONES UR-MCNC: NEGATIVE — SIGNIFICANT CHANGE UP
KETONES UR-MCNC: NEGATIVE — SIGNIFICANT CHANGE UP
LACTATE SERPL-SCNC: 0.9 MMOL/L — SIGNIFICANT CHANGE UP (ref 0.7–2)
LEUKOCYTE ESTERASE UR-ACNC: ABNORMAL
LEUKOCYTE ESTERASE UR-ACNC: NEGATIVE — SIGNIFICANT CHANGE UP
LIDOCAIN IGE QN: 45 U/L — SIGNIFICANT CHANGE UP (ref 7–60)
LYMPHOCYTES # BLD AUTO: 1.89 K/UL — SIGNIFICANT CHANGE UP (ref 1.2–3.4)
LYMPHOCYTES # BLD AUTO: 10.7 % — LOW (ref 20.5–51.1)
LYMPHOCYTES # BLD AUTO: 14.6 % — LOW (ref 20.5–51.1)
LYMPHOCYTES # BLD AUTO: 2.35 K/UL — SIGNIFICANT CHANGE UP (ref 1.2–3.4)
MCHC RBC-ENTMCNC: 30.8 PG — SIGNIFICANT CHANGE UP (ref 27–31)
MCHC RBC-ENTMCNC: 31.1 PG — HIGH (ref 27–31)
MCHC RBC-ENTMCNC: 33.4 G/DL — SIGNIFICANT CHANGE UP (ref 32–37)
MCHC RBC-ENTMCNC: 33.9 G/DL — SIGNIFICANT CHANGE UP (ref 32–37)
MCV RBC AUTO: 91.6 FL — SIGNIFICANT CHANGE UP (ref 81–99)
MCV RBC AUTO: 92.2 FL — SIGNIFICANT CHANGE UP (ref 81–99)
MONOCYTES # BLD AUTO: 0.77 K/UL — HIGH (ref 0.1–0.6)
MONOCYTES # BLD AUTO: 0.91 K/UL — HIGH (ref 0.1–0.6)
MONOCYTES NFR BLD AUTO: 4.4 % — SIGNIFICANT CHANGE UP (ref 1.7–9.3)
MONOCYTES NFR BLD AUTO: 5.7 % — SIGNIFICANT CHANGE UP (ref 1.7–9.3)
NEUTROPHILS # BLD AUTO: 12.54 K/UL — HIGH (ref 1.4–6.5)
NEUTROPHILS # BLD AUTO: 14.74 K/UL — HIGH (ref 1.4–6.5)
NEUTROPHILS NFR BLD AUTO: 78.1 % — HIGH (ref 42.2–75.2)
NEUTROPHILS NFR BLD AUTO: 83.6 % — HIGH (ref 42.2–75.2)
NITRITE UR-MCNC: NEGATIVE — SIGNIFICANT CHANGE UP
NITRITE UR-MCNC: NEGATIVE — SIGNIFICANT CHANGE UP
NRBC # BLD: 0 /100 WBCS — SIGNIFICANT CHANGE UP (ref 0–0)
NRBC # BLD: 0 /100 WBCS — SIGNIFICANT CHANGE UP (ref 0–0)
PH UR: 6 — SIGNIFICANT CHANGE UP (ref 5–8)
PH UR: 7 — SIGNIFICANT CHANGE UP (ref 5–8)
PLATELET # BLD AUTO: 277 K/UL — SIGNIFICANT CHANGE UP (ref 130–400)
PLATELET # BLD AUTO: 302 K/UL — SIGNIFICANT CHANGE UP (ref 130–400)
POTASSIUM SERPL-MCNC: 3.8 MMOL/L — SIGNIFICANT CHANGE UP (ref 3.5–5)
POTASSIUM SERPL-MCNC: 3.9 MMOL/L — SIGNIFICANT CHANGE UP (ref 3.5–5)
POTASSIUM SERPL-SCNC: 3.8 MMOL/L — SIGNIFICANT CHANGE UP (ref 3.5–5)
POTASSIUM SERPL-SCNC: 3.9 MMOL/L — SIGNIFICANT CHANGE UP (ref 3.5–5)
PROT SERPL-MCNC: 5.5 G/DL — LOW (ref 6–8)
PROT SERPL-MCNC: 5.9 G/DL — LOW (ref 6–8)
PROT UR-MCNC: ABNORMAL
PROT UR-MCNC: SIGNIFICANT CHANGE UP
RAPID RVP RESULT: SIGNIFICANT CHANGE UP
RBC # BLD: 3.34 M/UL — LOW (ref 4.2–5.4)
RBC # BLD: 3.67 M/UL — LOW (ref 4.2–5.4)
RBC # FLD: 13.1 % — SIGNIFICANT CHANGE UP (ref 11.5–14.5)
RBC # FLD: 13.2 % — SIGNIFICANT CHANGE UP (ref 11.5–14.5)
RBC CASTS # UR COMP ASSIST: 5 /HPF — HIGH (ref 0–4)
RBC CASTS # UR COMP ASSIST: 77 /HPF — HIGH (ref 0–4)
SARS-COV-2 RNA SPEC QL NAA+PROBE: SIGNIFICANT CHANGE UP
SODIUM SERPL-SCNC: 138 MMOL/L — SIGNIFICANT CHANGE UP (ref 135–146)
SODIUM SERPL-SCNC: 140 MMOL/L — SIGNIFICANT CHANGE UP (ref 135–146)
SP GR SPEC: 1.02 — SIGNIFICANT CHANGE UP (ref 1.01–1.03)
SP GR SPEC: 1.02 — SIGNIFICANT CHANGE UP (ref 1.01–1.03)
UROBILINOGEN FLD QL: SIGNIFICANT CHANGE UP
UROBILINOGEN FLD QL: SIGNIFICANT CHANGE UP
WBC # BLD: 16.06 K/UL — HIGH (ref 4.8–10.8)
WBC # BLD: 17.63 K/UL — HIGH (ref 4.8–10.8)
WBC # FLD AUTO: 16.06 K/UL — HIGH (ref 4.8–10.8)
WBC # FLD AUTO: 17.63 K/UL — HIGH (ref 4.8–10.8)
WBC UR QL: 27 /HPF — HIGH (ref 0–5)
WBC UR QL: 4 /HPF — SIGNIFICANT CHANGE UP (ref 0–5)

## 2022-12-29 PROCEDURE — 76770 US EXAM ABDO BACK WALL COMP: CPT | Mod: 26,59

## 2022-12-29 PROCEDURE — 59025 FETAL NON-STRESS TEST: CPT | Mod: 26

## 2022-12-29 PROCEDURE — 74181 MRI ABDOMEN W/O CONTRAST: CPT | Mod: 26,MA

## 2022-12-29 PROCEDURE — 72195 MRI PELVIS W/O DYE: CPT | Mod: 26,MA

## 2022-12-29 PROCEDURE — 99417 PROLNG OP E/M EACH 15 MIN: CPT | Mod: 25

## 2022-12-29 PROCEDURE — 99205 OFFICE O/P NEW HI 60 MIN: CPT | Mod: 25

## 2022-12-29 PROCEDURE — 76815 OB US LIMITED FETUS(S): CPT | Mod: 26

## 2022-12-29 PROCEDURE — 76819 FETAL BIOPHYS PROFIL W/O NST: CPT | Mod: 26

## 2022-12-29 PROCEDURE — 99204 OFFICE O/P NEW MOD 45 MIN: CPT | Mod: 25

## 2022-12-29 PROCEDURE — 76705 ECHO EXAM OF ABDOMEN: CPT | Mod: 26

## 2022-12-29 PROCEDURE — 76705 ECHO EXAM OF ABDOMEN: CPT | Mod: 26,77

## 2022-12-29 RX ORDER — MORPHINE SULFATE 50 MG/1
2 CAPSULE, EXTENDED RELEASE ORAL ONCE
Refills: 0 | Status: DISCONTINUED | OUTPATIENT
Start: 2022-12-29 | End: 2022-12-29

## 2022-12-29 RX ORDER — GENTAMICIN SULFATE 40 MG/ML
80 VIAL (ML) INJECTION ONCE
Refills: 0 | Status: COMPLETED | OUTPATIENT
Start: 2022-12-29 | End: 2022-12-29

## 2022-12-29 RX ORDER — ACETAMINOPHEN 500 MG
975 TABLET ORAL EVERY 6 HOURS
Refills: 0 | Status: DISCONTINUED | OUTPATIENT
Start: 2022-12-29 | End: 2022-12-29

## 2022-12-29 RX ORDER — CITRIC ACID/SODIUM CITRATE 300-500 MG
30 SOLUTION, ORAL ORAL ONCE
Refills: 0 | Status: COMPLETED | OUTPATIENT
Start: 2022-12-29 | End: 2022-12-29

## 2022-12-29 RX ORDER — ACETAMINOPHEN 500 MG
1000 TABLET ORAL ONCE
Refills: 0 | Status: COMPLETED | OUTPATIENT
Start: 2022-12-29 | End: 2022-12-29

## 2022-12-29 RX ORDER — FAMOTIDINE 10 MG/ML
1 INJECTION INTRAVENOUS
Qty: 30 | Refills: 0
Start: 2022-12-29 | End: 2023-01-27

## 2022-12-29 RX ORDER — TAMSULOSIN HYDROCHLORIDE 0.4 MG/1
0.4 CAPSULE ORAL DAILY
Refills: 0 | Status: DISCONTINUED | OUTPATIENT
Start: 2022-12-29 | End: 2022-12-29

## 2022-12-29 RX ORDER — ONDANSETRON 8 MG/1
4 TABLET, FILM COATED ORAL EVERY 4 HOURS
Refills: 0 | Status: DISCONTINUED | OUTPATIENT
Start: 2022-12-29 | End: 2022-12-29

## 2022-12-29 RX ORDER — ONDANSETRON 8 MG/1
4 TABLET, FILM COATED ORAL ONCE
Refills: 0 | Status: COMPLETED | OUTPATIENT
Start: 2022-12-29 | End: 2022-12-29

## 2022-12-29 RX ORDER — SUCRALFATE 1 G
1 TABLET ORAL ONCE
Refills: 0 | Status: COMPLETED | OUTPATIENT
Start: 2022-12-29 | End: 2022-12-29

## 2022-12-29 RX ORDER — FAMOTIDINE 10 MG/ML
20 INJECTION INTRAVENOUS ONCE
Refills: 0 | Status: COMPLETED | OUTPATIENT
Start: 2022-12-29 | End: 2022-12-29

## 2022-12-29 RX ORDER — TAMSULOSIN HYDROCHLORIDE 0.4 MG/1
1 CAPSULE ORAL
Qty: 14 | Refills: 0
Start: 2022-12-29 | End: 2023-01-11

## 2022-12-29 RX ORDER — LEVOTHYROXINE SODIUM 125 MCG
1 TABLET ORAL
Qty: 0 | Refills: 0 | DISCHARGE

## 2022-12-29 RX ORDER — LEVOTHYROXINE SODIUM 125 MCG
175 TABLET ORAL DAILY
Refills: 0 | Status: DISCONTINUED | OUTPATIENT
Start: 2022-12-29 | End: 2022-12-29

## 2022-12-29 RX ORDER — SODIUM CHLORIDE 9 MG/ML
1000 INJECTION, SOLUTION INTRAVENOUS
Refills: 0 | Status: DISCONTINUED | OUTPATIENT
Start: 2022-12-29 | End: 2022-12-29

## 2022-12-29 RX ORDER — MORPHINE SULFATE 50 MG/1
2 CAPSULE, EXTENDED RELEASE ORAL EVERY 4 HOURS
Refills: 0 | Status: DISCONTINUED | OUTPATIENT
Start: 2022-12-29 | End: 2022-12-29

## 2022-12-29 RX ADMIN — ONDANSETRON 4 MILLIGRAM(S): 8 TABLET, FILM COATED ORAL at 03:50

## 2022-12-29 RX ADMIN — Medication 1 GRAM(S): at 08:46

## 2022-12-29 RX ADMIN — MORPHINE SULFATE 2 MILLIGRAM(S): 50 CAPSULE, EXTENDED RELEASE ORAL at 08:46

## 2022-12-29 RX ADMIN — SODIUM CHLORIDE 125 MILLILITER(S): 9 INJECTION, SOLUTION INTRAVENOUS at 03:31

## 2022-12-29 RX ADMIN — FAMOTIDINE 20 MILLIGRAM(S): 10 INJECTION INTRAVENOUS at 03:52

## 2022-12-29 RX ADMIN — Medication 400 MILLIGRAM(S): at 04:20

## 2022-12-29 RX ADMIN — Medication 30 MILLILITER(S): at 08:47

## 2022-12-29 RX ADMIN — Medication 204 MILLIGRAM(S): at 17:52

## 2022-12-29 RX ADMIN — Medication 175 MICROGRAM(S): at 06:26

## 2022-12-29 RX ADMIN — MORPHINE SULFATE 2 MILLIGRAM(S): 50 CAPSULE, EXTENDED RELEASE ORAL at 06:26

## 2022-12-29 NOTE — DISCHARGE NOTE ANTEPARTUM - CARE PROVIDER_API CALL
Suraj Núñez)  Obstetrics and Gynecology  [address] [place] [address]  Phone: [phone]  Fax: [phone]  Established Patient  Follow Up Time: 1 week

## 2022-12-29 NOTE — OB PROVIDER H&P - HISTORY OF PRESENT ILLNESS
33 yo  @ 25w by Embryo transfer (22), MAURO 22. Presents to triage for abdominal pain that started around 2200, right sided, 7/10 pain, constant, worse with movement. She states that she has had GERD this whole pregnancy, vomits about 1 x a week. Last monday she vomited a blood clot and last night around midnight she vomited some blood. Patient denies any contractions, vb, lof, chest pain, LE pain, fever, chills, epigastric pain, dysuria, palpitations. H/o hypothyroidism, 175mcg daily. She states that early in pregnancy she had a subchorionic hematoma that has now resolved. GBS unknown.    Last PO 1900 Burger  Last BM 2100  Last intercourse 3 weeks ago    ADDENDUM: Patient found to have nephrolithiasis on imaging. Admitted for pain management and conservative treatment.

## 2022-12-29 NOTE — OB PROVIDER H&P - ATTENDING COMMENTS
pt not for admission  + kidney stones on sonogram  + urine consistant with stone  Called pt OB-obtained hx and informed OB of status    I was physically present for the key portions of the evaluation and management (e/m) service provided. I agree with the above history,physical and plan which I have reviewed and edited where appropriate  Patient seen face to face and case reviewed, precautions given to patient    Patient presented to triage prior to my shift. I received sign out from OB STAFF I started my evaluation at  07:30. The fetal heart rate monitor and tocometer were interpreted. The patient left traige area @18:18 I spent 938 minutes caring for the patient. It included obtaining a history, performing an examination, monitoring the fetus and interpretation of the fetal heart rate strip, ordering and reviewing labs, Calling MRI, assisting in facilitating MRI and US, documenting in the medical record and a discussion with the patient on multiple occasions with call made to her OBGYN    nst reactive  sonogram  MVP>2cm

## 2022-12-29 NOTE — DISCHARGE NOTE ANTEPARTUM - CARE PLAN
Principal Discharge DX:	Nephrolithiasis  Assessment and plan of treatment:	S/p IVF hydration and pain management prn. 3mm right calculus noted. Discharged home with tamsulosin   1

## 2022-12-29 NOTE — OB PROVIDER H&P - NS_FETALPRESSONO_OBGYN_ALL_OB
Cephalic Complex Repair And M Plasty Text: The defect edges were debeveled with a #15 scalpel blade.  The primary defect was closed partially with a complex linear closure.  Given the location of the remaining defect, shape of the defect and the proximity to free margins an M plasty was deemed most appropriate for complete closure of the defect.  Using a sterile surgical marker, an appropriate advancement flap was drawn incorporating the defect and placing the expected incisions within the relaxed skin tension lines where possible.    The area thus outlined was incised deep to adipose tissue with a #15 scalpel blade.  The skin margins were undermined to an appropriate distance in all directions utilizing iris scissors.

## 2022-12-29 NOTE — OB PROVIDER TRIAGE NOTE - HISTORY OF PRESENT ILLNESS
31 yo  @ 25w by Embryo transfer (22), MAURO 22. Presents to triage for abdominal pain that started around 2200, right sided, 7/10 pain, constant, worse with movement. She states that she has had GERD this whole pregnancy, vomits about 1 x a week. Last monday she vomited a blood clot and last night around midnight she vomited some blood. Patient denies any contractions, vb, lof, chest pain, LE pain, fever, chills, epigastric pain, dysuria, palpitations. She states that early in pregnancy she had a subchorionic hematoma that has now resolved.     Last PO 1900 Burger  Last BM 2100  Last intercourse 3 weeks ago 31 yo  @ 25w by Embryo transfer (22), MAURO 22. Presents to triage for abdominal pain that started around 2200, right sided, 7/10 pain, constant, worse with movement. She states that she has had GERD this whole pregnancy, vomits about 1 x a week. Last monday she vomited a blood clot and last night around midnight she vomited some blood. Patient denies any contractions, vb, lof, chest pain, LE pain, fever, chills, epigastric pain, dysuria, palpitations. She states that early in pregnancy she had a subchorionic hematoma that has now resolved. GBS unknown.    Last PO 1900 Burger  Last BM 2100  Last intercourse 3 weeks ago 33 yo  @ 25w by Embryo transfer (22), MAURO 22. Presents to triage for abdominal pain that started around 2200, right sided, 7/10 pain, constant, worse with movement. She states that she has had GERD this whole pregnancy, vomits about 1 x a week. Last monday she vomited a blood clot and last night around midnight she vomited some blood. Patient denies any contractions, vb, lof, chest pain, LE pain, fever, chills, epigastric pain, dysuria, palpitations. H/o hypothyroidism, 175mcg daily. She states that early in pregnancy she had a subchorionic hematoma that has now resolved. GBS unknown.    Last PO 1900 Burger  Last BM 2100  Last intercourse 3 weeks ago

## 2022-12-29 NOTE — OB RN TRIAGE NOTE - FALL HARM RISK - UNIVERSAL INTERVENTIONS
Bed in lowest position, wheels locked, appropriate side rails in place/Call bell, personal items and telephone in reach/Instruct patient to call for assistance before getting out of bed or chair/Non-slip footwear when patient is out of bed/[place] to call system/Physically safe environment - no spills, clutter or unnecessary equipment/Purposeful Proactive Rounding/Room/bathroom lighting operational, light cord in reach

## 2022-12-29 NOTE — OB PROVIDER TRIAGE NOTE - NSOBPROVIDERNOTE_OBGYN_ALL_OB_FT
33 yo  @ 25weeks, IVF pregnancy, right sided abdominal pain, not in PTL, r/o appendicitis    -RUQ/RLQ sono  -CBC, Amylase, lipase  -IVF   -Pepcid, zoftran  -Continue to monitor    Dr. Mccarthy and Dr. Eid aware

## 2022-12-29 NOTE — DISCHARGE NOTE ANTEPARTUM - MEDICATION SUMMARY - MEDICATIONS TO TAKE
I will START or STAY ON the medications listed below when I get home from the hospital:    tamsulosin 0.4 mg oral capsule  -- 1 cap(s) by mouth once a day  -- Indication: For nephrolithasis     Bactrim  mg-160 mg oral tablet  -- 1 tab(s) by mouth 2 times a day   -- Avoid prolonged or excessive exposure to direct and/or artificial sunlight while taking this medication.  Finish all this medication unless otherwise directed by prescriber.  Medication should be taken with plenty of water.    -- Indication: For nephrolithiasis

## 2022-12-29 NOTE — OB PROVIDER H&P - NSHPPHYSICALEXAM_GEN_ALL_CORE
Vital Signs Last 24 Hrs  T(C): 37.1 (29 Dec 2022 02:21), Max: 37.1 (29 Dec 2022 02:21)  T(F): 98.7 (29 Dec 2022 02:21), Max: 98.7 (29 Dec 2022 02:21)  HR: 72 (29 Dec 2022 02:23) (72 - 72)  BP: 123/75 (29 Dec 2022 02:23) (123/75 - 123/75)  RR: 18 (29 Dec 2022 02:21) (18 - 18)    Gen: NAD, AOx3  Abdomen: soft, right sided tenderness, gravid, no guarding, no rebound tenderness, negative obturator sign, no CVA tenderness    EFM:135bpm/mod/pos accels  [place]:none  SVE:0/0/-3 vtx intact  Speculum: no pooling, no bleeding  BSS: BPP 10/10 MVP 7.8cm breech  CL 4.2cm

## 2022-12-29 NOTE — OB PROVIDER TRIAGE NOTE - NSHPLABSRESULTS_GEN_ALL_CORE
labs  9/16/22  HbsAg NR  Measles Immune  Rubella Immune  Trep NR  O pos anti neg  HIV NR    sono  20w4d 14oz  vtx post placenta anatomy wnl   12w5d NT 1.7mm nml

## 2022-12-29 NOTE — DISCHARGE NOTE ANTEPARTUM - HOSPITAL COURSE
Presented to the hospital with severe abdominal pain. S/p extensive imaging and labwork. S/p MRI, RUQ ultrasound, RLQ ultrasound, bladder and kidney ultrasound. Bladder and kidney ultrasound demonstrated 3mm right renal calculus.     Received intravenous pain management. Received IVF hydration. Discharged home after improvement in her abdominal pain with outpatient follow-up with PMD

## 2022-12-29 NOTE — OB PROVIDER TRIAGE NOTE - NSHPPHYSICALEXAM_GEN_ALL_CORE
Vital Signs Last 24 Hrs  T(C): 37.1 (29 Dec 2022 02:21), Max: 37.1 (29 Dec 2022 02:21)  T(F): 98.7 (29 Dec 2022 02:21), Max: 98.7 (29 Dec 2022 02:21)  HR: 72 (29 Dec 2022 02:23) (72 - 72)  BP: 123/75 (29 Dec 2022 02:23) (123/75 - 123/75)  RR: 18 (29 Dec 2022 02:21) (18 - 18)    Gen: NAD, AOx3  Abdomen: soft, right sided tenderness, gravid, no guarding, no rebound    EFM:  [place]:  SVE: Vital Signs Last 24 Hrs  T(C): 37.1 (29 Dec 2022 02:21), Max: 37.1 (29 Dec 2022 02:21)  T(F): 98.7 (29 Dec 2022 02:21), Max: 98.7 (29 Dec 2022 02:21)  HR: 72 (29 Dec 2022 02:23) (72 - 72)  BP: 123/75 (29 Dec 2022 02:23) (123/75 - 123/75)  RR: 18 (29 Dec 2022 02:21) (18 - 18)    Gen: NAD, AOx3  Abdomen: soft, right sided tenderness, gravid, no guarding, no rebound tenderness, negative obturator sign, no CVA tenderness    EFM:135bpm/mod/pos accels  [place]:none  SVE:0/0/-3 vtx intact    BSS: BPP 10/10 MVP 7.8cm vtx  CL 4.2cm Vital Signs Last 24 Hrs  T(C): 37.1 (29 Dec 2022 02:21), Max: 37.1 (29 Dec 2022 02:21)  T(F): 98.7 (29 Dec 2022 02:21), Max: 98.7 (29 Dec 2022 02:21)  HR: 72 (29 Dec 2022 02:23) (72 - 72)  BP: 123/75 (29 Dec 2022 02:23) (123/75 - 123/75)  RR: 18 (29 Dec 2022 02:21) (18 - 18)    Gen: NAD, AOx3  Abdomen: soft, right sided tenderness, gravid, no guarding, no rebound tenderness, negative obturator sign, no CVA tenderness    EFM:135bpm/mod/pos accels  [place]:none  SVE:0/0/-3 vtx intact  Speculum: no pooling, no bleeding  BSS: BPP 10/10 MVP 7.8cm breech  CL 4.2cm

## 2022-12-29 NOTE — OB PROVIDER TRIAGE NOTE - NS_PRENATALRECORD_OBGYN_ALL_OB
Quality 431: Preventive Care And Screening: Unhealthy Alcohol Use - Screening: Patient screened for unhealthy alcohol use using a single question and scores less than 2 times per year Quality 226: Preventive Care And Screening: Tobacco Use: Screening And Cessation Intervention: Patient screened for tobacco use and is an ex/non-smoker Detail Level: Generalized Quality 130: Documentation Of Current Medications In The Medical Record: Current Medications Documented No

## 2022-12-29 NOTE — CONSULT NOTE ADULT - SUBJECTIVE AND OBJECTIVE BOX
[facility] Consult    TRIAGE/ADMISSIONI HPI:  HPI:  31 yo  @ 25w by Embryo transfer (22) (male factor), MAURO 22. Presents to triage for abdominal pain that started around 2200, right sided, 7/10 pain, constant, worse with movement. She states that she has had GERD this whole pregnancy, vomits about 1 x a week. Last monday she vomited a blood clot and last night around midnight she vomited some blood. Patient denies any contractions, vb, lof, chest pain, LE pain, fever, chills, epigastric pain, dysuria, palpitations. H/o hypothyroidism, 175mcg daily. She states that early in pregnancy she had a subchorionic hematoma that has now resolved. GBS unknown.    Last PO 1900 Burger  Last BM 2100  Last intercourse 3 weeks ago (29 Dec 2022 03:46)        PAST MEDICAL & SURGICAL HISTORY:  Pneumonia  2016- hospitalized for fever or unknown origin      Hypothyroid  WAS HYPERTHYROID D/T NODULES/ GOITER RCVD RADIOACTIVE IODINE     Endometrial polyp removal.    Obstetric history:      GYN history:  No abnormal pap smears, no STDs     Allergies    penicillin (Rash)    Intolerances        MEDICATIONS  (STANDING):  lactated ringers. 1000 levothyroxine 175 MICROGram(s) Oral daily    FAMILY HISTORY:  Family history of diabetes mellitus (DM) (Father)    Social history:  No alcohol use, drug use, or smoking during the pregnancy.    Review of systems:  A complete review of systems was obtained and is negative except as described in the HPI.    General:  In no apparent distress  T(F): 97.88 (22 @ 11:28), Max: 98.78 (22 @ 04:14)  HR: 68 (22 @ 13:54) (66 - 83)  BP: 119/70 (22 @ 13:54) (113/58 - 126/64)  RR: 18 (22 @ 02:21) (18 - 18)  SpO2: --  I&O's Summary      HEENT:  Atraumatic.    CV:  Normal S1 S2.  No murmurs.  Pulmonary:  Clear to auscultation bilaterally.  No wheezing.  Abdomen:  Soft, nontender, nondistended, no rebound, no guarding.  Back:  No CVA tenderness  Musculoskeletal:  No calf tenderness  Neurology:  Deep tendon reflexes 2+ bilaterally.  Psych:  Awake, alert, oriented to person, place, time.    LABORATORY:                        11.4   17.63 )-----------( 302      ( 29 Dec 2022 03:03 )             33.6         138  |  103  |  11  ----------------------------<  107<H>  3.9   |  20  |  0.9    Ca    9.4      29 Dec 2022 03:03    TPro  5.9<L>  /  Alb  3.8  /  TBili  0.2  /  DBili  x   /  AST  11  /  ALT  8   /  AlkPhos  82      Amylase, Serum Total (22 @ 03:03)    Amylase, Serum Total: 158 U/L    Lipase, Serum (22 @ 03:03)    Lipase, Serum: 45 U/L    Urinalysis Basic - ( 29 Dec 2022 12:52 )    Color: Light Yellow / Appearance: Clear / S.016 / pH: x  Gluc: x / Ketone: Negative  / Bili: Negative / Urobili: <2 mg/dL   Blood: x / Protein: Trace / Nitrite: Negative   Leuk Esterase: Negative / RBC: 5 /HPF / WBC 4 /HPF   Sq Epi: x / Non Sq Epi: 2 /HPF / Bacteria: Negative    Urine Microscopic-Add On (NC) (22 @ 04:00)    Red Blood Cell - Urine: 77 /HPF    White Blood Cell - Urine: 27 /HPF    Hyaline Casts: 12 /LPF    Epithelial Cells: 5 /HPF    Calcium Oxalate Crystals: Moderate    Bacteria: Many      Urine Microscopic-Add On (NC) (22 @ 12:52)    Red Blood Cell - Urine: 5 /HPF    White Blood Cell - Urine: 4 /HPF    Hyaline Casts: 1 /LPF    Bacteria: Negative    Epithelial Cells: 2 /HPF          IMAGING:  < from: MR Pelvis No Cont (22 @ 11:20) >    IMPRESSION:    Normal appendix.    Mild right hydronephrosis. No definite obstructing calculus seen by MRI.   This could also be physiologic in the setting of pregnancy.    --- End of Report ---    < end of copied text >    < from: US Kidney and Bladder (22 @ 13:53) >  IMPRESSION:  1.  Mild right hydronephrosis. Relatively weaker right ureteral jet   compared to the left with a questionable echogenic focus in the expected   region of the right distal ureter which may represent at least a   partially obstructing distal ureteral stone however a phlebolith cannot   be excluded.  2.  Review of the prior MRI demonstrates compression of the right mid   ureter between the gravid uterus and the right psoas which is a   physiologic finding of pregnancy however it appears the distal ureter   (caudal to the physiologic obstructive point) is also dilated which may   support the diagnosis of distal ureteral/UVJ at least partially   obstructing calculus. Both of these etiologies may be contributing to the   observed right hydronephrosis.  3.  Nonobstructing left midpole 5 mm calyceal calculus. No left   hydronephrosis.    --- End of Report ---    < end of copied text >    < from: US Abdomen Upper Quadrant Right (12.29.22 @ 05:34) >  IMPRESSION:    No sonographic evidence of acute cholecystitis.    No right hydronephrosis.

## 2022-12-29 NOTE — OB PROVIDER H&P - ASSESSMENT
33 yo  at 25w0d, IVF pregnancy, right sided abdominal pain, not in PTL, now with nephrolithiasis, here for pain management    - f/u repeat CBC, CMP Amylase  - IVF   - flowmax  - pain management prn   - Continue to monitor  - if labs wnl, WBC is downtrending, and NST reactive, can transfer to 4D     and  aware  33 yo  at 25w0d, IVF pregnancy, right sided abdominal pain, not in PTL, now with nephrolithiasis, here for pain management    - f/u repeat CBC, CMP Amylase  - IVF   - flowmax  - pain management prn   - Continue to monitor  - if labs wnl, WBC is downtrending, and NST reactive, can discharge     and  aware

## 2022-12-29 NOTE — DISCHARGE NOTE ANTEPARTUM - ADDITIONAL INSTRUCTIONS
Please follow-up with your provider at your next scheduled prenatal appointment. Please call your physician if you experience any of the following:   -vaginal bleeding  -decreased fetal movement  -leakage of fluid  -contractions     Please schedule an appointment to see your physician in one week

## 2022-12-29 NOTE — CONSULT NOTE ADULT - ASSESSMENT
Ms. Kim is a 31 yo  @ 25 weeks with right sided abdominal pain requiring Tylenol and Morphine overnight.  We discussed the different etiologies of abdominal pain during pregnancy including a GI etiology, a   etiology, or an Obstetric etiology such as chorioamnionitis.  Given her vital signs, the improvement of her abdominal pain, and the location of her pain I think that chorioamnionitis is less likely.    Abdominal pain  - Improving  - Has not required medicine for pain control over 4 hours.  - MRI nondiagnostic  - Renal ultrasound (was pending at the time of the consult, results are now available Nonobstructing left midpole 5 mm calyceal calculus. No left hydronephrosis.  Possible distal ureteral/UVJ at least partially   obstructing calculus.  -Repeat labs  - NST bid    Rashad Mackay MD

## 2022-12-29 NOTE — DISCHARGE NOTE ANTEPARTUM - PLAN OF CARE
S/p IVF hydration and pain management prn. 3mm right calculus noted. Discharged home with tamsulosin

## 2022-12-29 NOTE — DISCHARGE NOTE ANTEPARTUM - NS MD DC FALL RISK RISK
For information on Fall & Injury Prevention, visit: [email or web address] OR  [email or web address] OR  https://www.cdc.gov/steadi/patient.html

## 2022-12-29 NOTE — DISCHARGE NOTE ANTEPARTUM - PATIENT PORTAL LINK FT
You can access the FollowMyHealth Patient Portal offered by [facility] by registering at the following website: [email or web address]. By joining [facility]’s FollowMyHealth portal, you will also be able to view your health information using other applications (apps) compatible with our system.

## 2022-12-30 LAB
CULTURE RESULTS: NO GROWTH — SIGNIFICANT CHANGE UP
CULTURE RESULTS: SIGNIFICANT CHANGE UP
SPECIMEN SOURCE: SIGNIFICANT CHANGE UP
SPECIMEN SOURCE: SIGNIFICANT CHANGE UP

## 2022-12-31 LAB
A VAGINAE DNA VAG QL NAA+PROBE: SIGNIFICANT CHANGE UP
BVAB2 DNA VAG QL NAA+PROBE: SIGNIFICANT CHANGE UP
C ALBICANS DNA VAG QL NAA+PROBE: NEGATIVE — SIGNIFICANT CHANGE UP
C GLABRATA DNA VAG QL NAA+PROBE: NEGATIVE — SIGNIFICANT CHANGE UP
C KRUSEI DNA VAG QL NAA+PROBE: NEGATIVE — SIGNIFICANT CHANGE UP
C LUSITANIAE DNA VAG QL NAA+PROBE: NEGATIVE — SIGNIFICANT CHANGE UP
C TRACH RRNA SPEC QL NAA+PROBE: NEGATIVE — SIGNIFICANT CHANGE UP
MEGA1 DNA VAG QL NAA+PROBE: SIGNIFICANT CHANGE UP
N GONORRHOEA RRNA SPEC QL NAA+PROBE: NEGATIVE — SIGNIFICANT CHANGE UP
T VAGINALIS RRNA SPEC QL NAA+PROBE: NEGATIVE — SIGNIFICANT CHANGE UP

## 2023-02-14 ENCOUNTER — ASOB RESULT (OUTPATIENT)
Age: 33
End: 2023-02-14

## 2023-02-14 ENCOUNTER — APPOINTMENT (OUTPATIENT)
Dept: ANTEPARTUM | Facility: CLINIC | Age: 33
End: 2023-02-14
Payer: COMMERCIAL

## 2023-02-14 VITALS
DIASTOLIC BLOOD PRESSURE: 80 MMHG | WEIGHT: 224 LBS | HEART RATE: 97 BPM | SYSTOLIC BLOOD PRESSURE: 124 MMHG | BODY MASS INDEX: 41.22 KG/M2 | HEIGHT: 62 IN

## 2023-02-14 PROCEDURE — 76816 OB US FOLLOW-UP PER FETUS: CPT

## 2023-03-14 ENCOUNTER — ASOB RESULT (OUTPATIENT)
Age: 33
End: 2023-03-14

## 2023-03-14 ENCOUNTER — APPOINTMENT (OUTPATIENT)
Dept: ANTEPARTUM | Facility: CLINIC | Age: 33
End: 2023-03-14
Payer: COMMERCIAL

## 2023-03-14 VITALS
HEART RATE: 108 BPM | DIASTOLIC BLOOD PRESSURE: 76 MMHG | HEIGHT: 62 IN | WEIGHT: 236 LBS | SYSTOLIC BLOOD PRESSURE: 124 MMHG | BODY MASS INDEX: 43.43 KG/M2

## 2023-03-14 PROCEDURE — 76819 FETAL BIOPHYS PROFIL W/O NST: CPT | Mod: 59

## 2023-03-14 PROCEDURE — 76816 OB US FOLLOW-UP PER FETUS: CPT

## 2023-03-23 ENCOUNTER — ASOB RESULT (OUTPATIENT)
Age: 33
End: 2023-03-23

## 2023-03-23 ENCOUNTER — APPOINTMENT (OUTPATIENT)
Dept: ANTEPARTUM | Facility: CLINIC | Age: 33
End: 2023-03-23
Payer: COMMERCIAL

## 2023-03-23 VITALS
HEIGHT: 62 IN | HEART RATE: 79 BPM | SYSTOLIC BLOOD PRESSURE: 122 MMHG | DIASTOLIC BLOOD PRESSURE: 80 MMHG | WEIGHT: 238 LBS | BODY MASS INDEX: 43.79 KG/M2

## 2023-03-23 PROCEDURE — ZZZZZ: CPT

## 2023-03-23 PROCEDURE — 76818 FETAL BIOPHYS PROFILE W/NST: CPT

## 2023-03-30 ENCOUNTER — ASOB RESULT (OUTPATIENT)
Age: 33
End: 2023-03-30

## 2023-03-30 ENCOUNTER — APPOINTMENT (OUTPATIENT)
Dept: ANTEPARTUM | Facility: CLINIC | Age: 33
End: 2023-03-30
Payer: COMMERCIAL

## 2023-03-30 VITALS
HEART RATE: 94 BPM | BODY MASS INDEX: 43.43 KG/M2 | DIASTOLIC BLOOD PRESSURE: 79 MMHG | HEIGHT: 62 IN | SYSTOLIC BLOOD PRESSURE: 120 MMHG | WEIGHT: 236 LBS

## 2023-03-30 PROCEDURE — 76819 FETAL BIOPHYS PROFIL W/O NST: CPT

## 2023-04-06 ENCOUNTER — APPOINTMENT (OUTPATIENT)
Dept: ANTEPARTUM | Facility: CLINIC | Age: 33
End: 2023-04-06
Payer: COMMERCIAL

## 2023-04-06 ENCOUNTER — ASOB RESULT (OUTPATIENT)
Age: 33
End: 2023-04-06

## 2023-04-06 VITALS
BODY MASS INDEX: 43.98 KG/M2 | SYSTOLIC BLOOD PRESSURE: 124 MMHG | HEIGHT: 62 IN | DIASTOLIC BLOOD PRESSURE: 80 MMHG | WEIGHT: 239 LBS | HEART RATE: 105 BPM

## 2023-04-06 PROCEDURE — 76819 FETAL BIOPHYS PROFIL W/O NST: CPT

## 2023-04-13 ENCOUNTER — APPOINTMENT (OUTPATIENT)
Dept: ANTEPARTUM | Facility: CLINIC | Age: 33
End: 2023-04-13
Payer: COMMERCIAL

## 2023-04-13 ENCOUNTER — ASOB RESULT (OUTPATIENT)
Age: 33
End: 2023-04-13

## 2023-04-13 VITALS
WEIGHT: 246 LBS | BODY MASS INDEX: 45.27 KG/M2 | HEART RATE: 106 BPM | DIASTOLIC BLOOD PRESSURE: 80 MMHG | SYSTOLIC BLOOD PRESSURE: 130 MMHG | HEIGHT: 62 IN

## 2023-04-13 PROCEDURE — 76816 OB US FOLLOW-UP PER FETUS: CPT

## 2023-04-13 PROCEDURE — 76819 FETAL BIOPHYS PROFIL W/O NST: CPT | Mod: 59

## 2024-02-05 NOTE — ED PROVIDER NOTE - OBJECTIVE STATEMENT
Laparoscopy demonstrated grossly normal omentum and bowel, grossly normal uterus, bilateral fallopian tubes, bilateral ovaries. Physiologic free fluid noted. Ligasure used for bilateral salpingectomy, good hemostasis. 32-year-old female G2, P0 presented today with vaginal bleeding abdominal cramping.  Patient reports that she is approximately 6 weeks pregnant by IVF conception date.  Patient denies any fevers and chills, vomiting, diarrhea,   Syncope.  Reports having passage of small clots.

## 2024-03-18 ENCOUNTER — APPOINTMENT (OUTPATIENT)
Dept: ENDOCRINOLOGY | Facility: CLINIC | Age: 34
End: 2024-03-18
Payer: COMMERCIAL

## 2024-03-18 VITALS
HEART RATE: 86 BPM | DIASTOLIC BLOOD PRESSURE: 80 MMHG | BODY MASS INDEX: 36.99 KG/M2 | OXYGEN SATURATION: 98 % | WEIGHT: 201 LBS | SYSTOLIC BLOOD PRESSURE: 130 MMHG | HEIGHT: 62 IN

## 2024-03-18 DIAGNOSIS — F17.200 NICOTINE DEPENDENCE, UNSPECIFIED, UNCOMPLICATED: ICD-10-CM

## 2024-03-18 DIAGNOSIS — Z83.3 FAMILY HISTORY OF DIABETES MELLITUS: ICD-10-CM

## 2024-03-18 DIAGNOSIS — R79.89 OTHER SPECIFIED ABNORMAL FINDINGS OF BLOOD CHEMISTRY: ICD-10-CM

## 2024-03-18 PROCEDURE — 99204 OFFICE O/P NEW MOD 45 MIN: CPT

## 2024-03-18 RX ORDER — LEVOTHYROXINE SODIUM 0.14 MG/1
137 TABLET ORAL DAILY
Qty: 90 | Refills: 1 | Status: ACTIVE | COMMUNITY
Start: 2024-03-18 | End: 1900-01-01

## 2024-03-18 NOTE — DATA REVIEWED
[FreeTextEntry1] : 2/7/23: TSH 2.31  Ft4 1.55  1/19/24:   A1c 5.7% TSh 21.8   FT4 1.15  glucose 87   Tg 125 testosterone <3 LH 3.4  FSH 2.4 prolactin 14.8 DHEAS 47.6  cortisol 9.2  IGF 1 134

## 2024-03-18 NOTE — ASSESSMENT
[FreeTextEntry1] : 34-year-old lady who present for evaluation of hypothyroidism.  # hypothyroidism - diagnosed as a teenager with hypo/ hyperthyroidism , had hot nodule ? and had AGUILA , then needed levothyroxine - had IVF ( male factor) , baby is now 11 months - currently on Lt4 100 mcg daily , r - 1/2024:  TSH 21.8 had evaluation by nutritionist ? and was given DHEAS and prenatal MG fish oil ... - taking prenatal close to levothyroxine - planning another IVF in 8/2024   increase Lt4 to 137 mcg daily , discussed pill technique spacing it from prenatal and advised to stop DHEAS , no indication for use especially given she is planning another pregnancy soon   # vitamin D deficiency  - continue Vit D

## 2024-03-18 NOTE — HISTORY OF PRESENT ILLNESS
[FreeTextEntry1] : 34-year-old lady who present for evaluation of hypothyroidism.   # hypothyroidism  - diagnosed as a teenager with hypo/ hyperthyroidism ,  had hot nodule ? and had AGUILA , then needed levothyroxine  - had IVF ( male factor)  , baby is now 11 months  - currently on Lt4 100 mcg daily , recent TSH 21.8 had evaluation by nutritionist ?  and was given DHEAS and prenatal MG fish oil ... - taking prenatal close to levothyroxine  - planning another IVF in 8/2024

## 2024-03-18 NOTE — REVIEW OF SYSTEMS
[Fatigue] : fatigue [Recent Weight Gain (___ Lbs)] : recent weight gain: [unfilled] lbs [Blurred Vision] : no blurred vision [Dysphagia] : no dysphagia [Neck Pain] : no neck pain [Dysphonia] : no dysphonia [Chest Pain] : no chest pain [Slow Heart Rate] : heart rate is not slow [Shortness Of Breath] : no shortness of breath [Palpitations] : no palpitations [SOB on Exertion] : no shortness of breath on exertion [Nausea] : no nausea [Vomiting] : no vomiting [Diarrhea] : diarrhea [Acne] : no acne [Hirsutism] : no hirsutism [As Noted in HPI] : as noted in HPI

## 2024-03-18 NOTE — PHYSICAL EXAM
[Alert] : alert [Healthy Appearance] : healthy appearance [No Acute Distress] : no acute distress [No Proptosis] : no proptosis [No Lid Lag] : no lid lag [Thyroid Not Enlarged] : the thyroid was not enlarged [No Respiratory Distress] : no respiratory distress [No Accessory Muscle Use] : no accessory muscle use [Clear to Auscultation] : lungs were clear to auscultation bilaterally [Normal S1, S2] : normal S1 and S2 [Regular Rhythm] : with a regular rhythm [No Murmurs] : no murmurs [Soft] : abdomen soft [No Edema] : no peripheral edema [Abdominal Striae] : no abdominal striae [No Stigmata of Cushings Syndrome] : no stigmata of Cushings Syndrome [Acanthosis Nigricans] : no acanthosis nigricans [Acne] : no acne [Hirsutism] : no hirsutism [No Tremors] : no tremors [Oriented x3] : oriented to person, place, and time [de-identified] : possible nodule left

## 2024-06-18 ENCOUNTER — APPOINTMENT (OUTPATIENT)
Dept: ENDOCRINOLOGY | Facility: CLINIC | Age: 34
End: 2024-06-18
Payer: COMMERCIAL

## 2024-06-18 VITALS
WEIGHT: 202 LBS | SYSTOLIC BLOOD PRESSURE: 125 MMHG | BODY MASS INDEX: 37.17 KG/M2 | DIASTOLIC BLOOD PRESSURE: 80 MMHG | HEIGHT: 62 IN | OXYGEN SATURATION: 98 % | HEART RATE: 88 BPM

## 2024-06-18 DIAGNOSIS — E04.1 NONTOXIC SINGLE THYROID NODULE: ICD-10-CM

## 2024-06-18 DIAGNOSIS — E03.9 HYPOTHYROIDISM, UNSPECIFIED: ICD-10-CM

## 2024-06-18 PROCEDURE — 99213 OFFICE O/P EST LOW 20 MIN: CPT

## 2024-06-18 NOTE — HISTORY OF PRESENT ILLNESS
[FreeTextEntry1] : 34-year-old lady who present for evaluation of hypothyroidism.   # hypothyroidism  - diagnosed as a teenager with hypo/ hyperthyroidism ,  had hot nodule ? and had AGUILA , then needed levothyroxine  - had IVF ( male factor)  , baby is now 11 months  - currently on Lt4 100 mcg daily , recent TSH 21.8 had evaluation by nutritionist ?  and was given DHEAS and prenatal MG fish oil ... - taking prenatal close to levothyroxine  - 6/2024: Ft4 and t3 normal TSH not sone , on lt4 137 mcg daily  - planning another IVF in 8/2024

## 2024-06-18 NOTE — DATA REVIEWED
[FreeTextEntry1] : 2/7/23: TSH 2.31  Ft4 1.55  1/19/24:   A1c 5.7% TSh 21.8   FT4 1.15  glucose 87   Tg 125 testosterone <3 LH 3.4  FSH 2.4 prolactin 14.8 DHEAS 47.6  cortisol 9.2  IGF 1 134   6/2024: TSh not done Ft4 and t3 ok

## 2024-06-18 NOTE — REVIEW OF SYSTEMS
[Fatigue] : fatigue [Diarrhea] : diarrhea [As Noted in HPI] : as noted in HPI [Recent Weight Gain (___ Lbs)] : no recent weight gain [Blurred Vision] : no blurred vision [Dysphagia] : no dysphagia [Neck Pain] : no neck pain [Dysphonia] : no dysphonia [Chest Pain] : no chest pain [Slow Heart Rate] : heart rate is not slow [Palpitations] : no palpitations [Shortness Of Breath] : no shortness of breath [SOB on Exertion] : no shortness of breath on exertion [Nausea] : no nausea [Vomiting] : no vomiting [Acne] : no acne [Hirsutism] : no hirsutism

## 2024-06-18 NOTE — PHYSICAL EXAM
[Alert] : alert [Healthy Appearance] : healthy appearance [No Acute Distress] : no acute distress [No Proptosis] : no proptosis [No Lid Lag] : no lid lag [Thyroid Not Enlarged] : the thyroid was not enlarged [No Respiratory Distress] : no respiratory distress [No Accessory Muscle Use] : no accessory muscle use [Clear to Auscultation] : lungs were clear to auscultation bilaterally [Normal S1, S2] : normal S1 and S2 [No Murmurs] : no murmurs [Regular Rhythm] : with a regular rhythm [No Edema] : no peripheral edema [Soft] : abdomen soft [No Stigmata of Cushings Syndrome] : no stigmata of Cushings Syndrome [No Tremors] : no tremors [Oriented x3] : oriented to person, place, and time [Abdominal Striae] : no abdominal striae [Acanthosis Nigricans] : no acanthosis nigricans [Acne] : no acne [Hirsutism] : no hirsutism [de-identified] : possible nodule left

## 2024-06-18 NOTE — ASSESSMENT
[FreeTextEntry1] : 34-year-old lady who present for follow up evaluation of hypothyroidism.  # hypothyroidism - diagnosed as a teenager with hypo/ hyperthyroidism , had hot nodule ? and had AGUILA , then needed levothyroxine - had IVF ( male factor) , baby is now 11 months - currently on Lt4 100 mcg daily  - 1/2024:  TSH 21.8 had evaluation by nutritionist ? and was given DHEAS and prenatal MG fish oil ... - 6/2024: Ft4 and t3 normal TSH not sone , on lt4 137 mcg daily - planning another IVF in 8/2024 - continue Lt4 137 mcg daily now , will do labs once OVf process start and schedule telehealth to review  - space from prenatal  possible nodule : did not get us done   # vitamin D deficiency  - continue Vit D

## 2024-07-08 NOTE — ASU PREOP CHECKLIST - WEIGHT IN KG
Ambulatory Care Coordination Note     7/8/2024 1:15 PM     Family, Daughter  outreach attempt by this ACM today to offer care management services. ACM was unable to reach the familyPereti  by telephone today; left voice message requesting a return phone call to this ACM.     ACM: Aide Jacobo RN           Follow Up:   Plan for next AC outreach in approximately 1 week to complete:  - outreach attempt to offer care management services.        Aide Jacobo RN  Ambulatory Care Manager  [phone]  [email or web address]           
74.4

## 2024-09-19 ENCOUNTER — APPOINTMENT (OUTPATIENT)
Dept: ENDOCRINOLOGY | Facility: CLINIC | Age: 34
End: 2024-09-19
Payer: COMMERCIAL

## 2024-09-19 DIAGNOSIS — E03.9 HYPOTHYROIDISM, UNSPECIFIED: ICD-10-CM

## 2024-09-19 DIAGNOSIS — R73.03 PREDIABETES.: ICD-10-CM

## 2024-09-19 PROCEDURE — 99213 OFFICE O/P EST LOW 20 MIN: CPT

## 2024-09-19 NOTE — ASSESSMENT
[FreeTextEntry1] : 34-year-old lady who present for follow up evaluation of hypothyroidism.  # hypothyroidism - diagnosed as a teenager with hypo/ hyperthyroidism , had hot nodule ? and had AGUILA , then needed levothyroxine - had IVF ( male factor) - on Lt4 100 mcg daily , recent TSH 21.8 had evaluation by nutritionist ? and was given DHEAS and prenatal MG fish oil ... - taking prenatal close to levothyroxine - 6/2024: Ft4 and t3 normal TSH not sone , on lt4 137 mcg daily - 8/2024: TSH low Ft4 high , she had transfer on 9/17 she is now on Lt4 137 mcg daily , lost weight since last visit now 183 lbs  - continue Lt4 137 mcg daily for now , redo labs in 4 weeks , telehealth to review results   # preDM - 8/2024: A1c 5.8% doing low carb diet, discussed risk of GDM And low carb diet for now

## 2024-09-19 NOTE — HISTORY OF PRESENT ILLNESS
[FreeTextEntry1] : 34-year-old lady who present for follow up evaluation of hypothyroidism.   # hypothyroidism  - diagnosed as a teenager with hypo/ hyperthyroidism ,  had hot nodule ? and had AGUILA , then needed levothyroxine  - had IVF ( male factor)   -  on Lt4 100 mcg daily , recent TSH 21.8 had evaluation by nutritionist ?  and was given DHEAS and prenatal MG fish oil ... - taking prenatal close to levothyroxine  - 6/2024: Ft4 and t3 normal TSH not sone , on lt4 137 mcg daily  - 8/2024: TSH low Ft4 high , she had transfer on 9/17 she is now on Lt4 137 mcg daily , lost weight since last visit now 183 lbs     # preDM  - 8/2024: A1c 5.8% doing low carb diet

## 2024-09-19 NOTE — REASON FOR VISIT
[Follow - Up] : a follow-up visit [Hypothyroidism] : hypothyroidism [Home] : at home, [unfilled] , at the time of the visit. [Medical Office: ([facility])___] : at the medical office located in  [Patient] : the patient

## 2024-09-19 NOTE — DATA REVIEWED
[FreeTextEntry1] : 2/7/23: TSH 2.31  Ft4 1.55  1/19/24:   A1c 5.7% TSh 21.8   FT4 1.15  glucose 87   Tg 125 testosterone <3 LH 3.4  FSH 2.4 prolactin 14.8 DHEAS 47.6  cortisol 9.2  IGF 1 134   6/2024: TSh not done Ft4 and t3 ok  8/31/24: TSH 0.302  Ft4 2.15  A1c 5.8 %

## 2024-09-19 NOTE — REVIEW OF SYSTEMS
[Fatigue] : fatigue [Diarrhea] : diarrhea [As Noted in HPI] : as noted in HPI [Recent Weight Gain (___ Lbs)] : no recent weight gain [Recent Weight Loss (___ Lbs)] : recent weight loss: [unfilled] lbs [Blurred Vision] : no blurred vision [Dysphagia] : no dysphagia [Neck Pain] : no neck pain [Dysphonia] : no dysphonia [Chest Pain] : no chest pain [Slow Heart Rate] : heart rate is not slow [Palpitations] : no palpitations [Shortness Of Breath] : no shortness of breath [SOB on Exertion] : no shortness of breath on exertion [Nausea] : no nausea [Vomiting] : no vomiting [Acne] : no acne [Hirsutism] : no hirsutism

## 2024-10-24 ENCOUNTER — APPOINTMENT (OUTPATIENT)
Dept: ENDOCRINOLOGY | Facility: CLINIC | Age: 34
End: 2024-10-24

## 2024-10-24 ENCOUNTER — NON-APPOINTMENT (OUTPATIENT)
Age: 34
End: 2024-10-24

## 2025-05-19 DIAGNOSIS — E03.9 HYPOTHYROIDISM, UNSPECIFIED: ICD-10-CM

## 2025-06-03 NOTE — PROGRESS NOTE ADULT - SUBJECTIVE AND OBJECTIVE BOX
MFM Procedure Note    Abdominal pain:      Bedside ultrasound:  Vertex posterior placenta, fetal heart rate is 136 beats per minute, deepest vertical pocket 4.76 cm.  Biophysical profile is 8/8.    Reassuring fetal status.    Rashad Mackay MD    
76